# Patient Record
Sex: FEMALE | Race: OTHER | HISPANIC OR LATINO | URBAN - METROPOLITAN AREA
[De-identification: names, ages, dates, MRNs, and addresses within clinical notes are randomized per-mention and may not be internally consistent; named-entity substitution may affect disease eponyms.]

---

## 2017-06-17 ENCOUNTER — OUTPATIENT (OUTPATIENT)
Dept: OUTPATIENT SERVICES | Facility: HOSPITAL | Age: 22
LOS: 1 days | End: 2017-06-17
Payer: COMMERCIAL

## 2017-06-17 DIAGNOSIS — O47.1 FALSE LABOR AT OR AFTER 37 COMPLETED WEEKS OF GESTATION: ICD-10-CM

## 2017-06-17 PROCEDURE — 59025 FETAL NON-STRESS TEST: CPT

## 2017-06-17 PROCEDURE — G0463: CPT

## 2017-06-17 PROCEDURE — 76815 OB US LIMITED FETUS(S): CPT

## 2017-06-20 ENCOUNTER — INPATIENT (INPATIENT)
Facility: HOSPITAL | Age: 22
LOS: 2 days | Discharge: HOME | End: 2017-06-23
Attending: OBSTETRICS & GYNECOLOGY | Admitting: OBSTETRICS & GYNECOLOGY
Payer: COMMERCIAL

## 2017-06-20 DIAGNOSIS — O35.9XX0 MATERNAL CARE FOR (SUSPECTED) FETAL ABNORMALITY AND DAMAGE, UNSPECIFIED, NOT APPLICABLE OR UNSPECIFIED: ICD-10-CM

## 2017-06-20 LAB
ALBUMIN SERPL ELPH-MCNC: 3.1 G/DL — LOW (ref 3.3–5.2)
ALP SERPL-CCNC: 280 U/L — HIGH (ref 40–120)
ALT FLD-CCNC: 13 U/L — SIGNIFICANT CHANGE UP
ANION GAP SERPL CALC-SCNC: 14 MMOL/L — SIGNIFICANT CHANGE UP (ref 5–17)
AST SERPL-CCNC: 21 U/L — SIGNIFICANT CHANGE UP
BASOPHILS # BLD AUTO: 0 K/UL — SIGNIFICANT CHANGE UP (ref 0–0.2)
BASOPHILS NFR BLD AUTO: 0.4 % — SIGNIFICANT CHANGE UP (ref 0–2)
BILIRUB SERPL-MCNC: 0.5 MG/DL — SIGNIFICANT CHANGE UP (ref 0.4–2)
BLD GP AB SCN SERPL QL: SIGNIFICANT CHANGE UP
BUN SERPL-MCNC: 9 MG/DL — SIGNIFICANT CHANGE UP (ref 8–20)
CALCIUM SERPL-MCNC: 8.7 MG/DL — SIGNIFICANT CHANGE UP (ref 8.6–10.2)
CHLORIDE SERPL-SCNC: 104 MMOL/L — SIGNIFICANT CHANGE UP (ref 98–107)
CO2 SERPL-SCNC: 22 MMOL/L — SIGNIFICANT CHANGE UP (ref 22–29)
CREAT SERPL-MCNC: 0.51 MG/DL — SIGNIFICANT CHANGE UP (ref 0.5–1.3)
EOSINOPHIL # BLD AUTO: 0.7 K/UL — HIGH (ref 0–0.5)
EOSINOPHIL NFR BLD AUTO: 7.3 % — HIGH (ref 0–6)
GLUCOSE SERPL-MCNC: 89 MG/DL — SIGNIFICANT CHANGE UP (ref 70–115)
HCT VFR BLD CALC: 32 % — LOW (ref 37–47)
HGB BLD-MCNC: 10.7 G/DL — LOW (ref 12–16)
LYMPHOCYTES # BLD AUTO: 1.8 K/UL — SIGNIFICANT CHANGE UP (ref 1–4.8)
LYMPHOCYTES # BLD AUTO: 19.9 % — LOW (ref 20–55)
MCHC RBC-ENTMCNC: 25.8 PG — LOW (ref 27–31)
MCHC RBC-ENTMCNC: 33.4 G/DL — SIGNIFICANT CHANGE UP (ref 32–36)
MCV RBC AUTO: 77.3 FL — LOW (ref 81–99)
MONOCYTES # BLD AUTO: 0.7 K/UL — SIGNIFICANT CHANGE UP (ref 0–0.8)
MONOCYTES NFR BLD AUTO: 7.8 % — SIGNIFICANT CHANGE UP (ref 3–10)
NEUTROPHILS # BLD AUTO: 5.9 K/UL — SIGNIFICANT CHANGE UP (ref 1.8–8)
NEUTROPHILS NFR BLD AUTO: 64.4 % — SIGNIFICANT CHANGE UP (ref 37–73)
PLATELET # BLD AUTO: 186 K/UL — SIGNIFICANT CHANGE UP (ref 150–400)
POTASSIUM SERPL-MCNC: 4.3 MMOL/L — SIGNIFICANT CHANGE UP (ref 3.5–5.3)
POTASSIUM SERPL-SCNC: 4.3 MMOL/L — SIGNIFICANT CHANGE UP (ref 3.5–5.3)
PROT SERPL-MCNC: 6.8 G/DL — SIGNIFICANT CHANGE UP (ref 6.6–8.7)
RBC # BLD: 4.14 M/UL — LOW (ref 4.4–5.2)
RBC # FLD: 17.2 % — HIGH (ref 11–15.6)
SODIUM SERPL-SCNC: 140 MMOL/L — SIGNIFICANT CHANGE UP (ref 135–145)
TYPE + AB SCN PNL BLD: SIGNIFICANT CHANGE UP
WBC # BLD: 9.1 K/UL — SIGNIFICANT CHANGE UP (ref 4.8–10.8)
WBC # FLD AUTO: 9.1 K/UL — SIGNIFICANT CHANGE UP (ref 4.8–10.8)

## 2017-06-20 RX ORDER — SODIUM CHLORIDE 9 MG/ML
1000 INJECTION, SOLUTION INTRAVENOUS ONCE
Qty: 0 | Refills: 0 | Status: DISCONTINUED | OUTPATIENT
Start: 2017-06-20 | End: 2017-06-21

## 2017-06-20 RX ORDER — CITRIC ACID/SODIUM CITRATE 300-500 MG
30 SOLUTION, ORAL ORAL ONCE
Qty: 0 | Refills: 0 | Status: DISCONTINUED | OUTPATIENT
Start: 2017-06-20 | End: 2017-06-21

## 2017-06-20 RX ORDER — OXYTOCIN 10 UNIT/ML
333.33 VIAL (ML) INJECTION
Qty: 20 | Refills: 0 | Status: COMPLETED | OUTPATIENT
Start: 2017-06-20

## 2017-06-20 RX ORDER — SODIUM CHLORIDE 9 MG/ML
1000 INJECTION, SOLUTION INTRAVENOUS
Qty: 0 | Refills: 0 | Status: DISCONTINUED | OUTPATIENT
Start: 2017-06-20 | End: 2017-06-21

## 2017-06-21 VITALS
HEART RATE: 102 BPM | DIASTOLIC BLOOD PRESSURE: 70 MMHG | TEMPERATURE: 99 F | SYSTOLIC BLOOD PRESSURE: 133 MMHG | RESPIRATION RATE: 18 BRPM

## 2017-06-21 LAB
BASE EXCESS BLDCOA CALC-SCNC: -9.3 MMOL/L — SIGNIFICANT CHANGE UP (ref -11.6–0.4)
BASE EXCESS BLDCOV CALC-SCNC: -9.7 MMOL/L — LOW (ref -9.3–0.3)
GAS PNL BLDCOV: 7.09 — LOW (ref 7.11–7.36)
HCO3 BLDCOA-SCNC: 20 MMOL/L — SIGNIFICANT CHANGE UP (ref 15–27)
HCO3 BLDCOV-SCNC: 21 MMOL/L — SIGNIFICANT CHANGE UP (ref 17–25)
PCO2 BLDCOA: 62.1 MMHG — SIGNIFICANT CHANGE UP (ref 32.2–65.8)
PCO2 BLDCOV: 73.3 MMHG — CRITICAL HIGH (ref 27–49.4)
PH BLDCOA: 7.15 — SIGNIFICANT CHANGE UP (ref 7.11–7.36)
PO2 BLDCOA: 10 MMHG — LOW (ref 17.4–41)
PO2 BLDCOA: 18 MMHG — SIGNIFICANT CHANGE UP (ref 6–30)
RPR SERPL-ACNC: SIGNIFICANT CHANGE UP
SAO2 % BLDCOA: SIGNIFICANT CHANGE UP
SAO2 % BLDCOV: SIGNIFICANT CHANGE UP

## 2017-06-21 RX ORDER — ACETAMINOPHEN 500 MG
650 TABLET ORAL EVERY 6 HOURS
Qty: 0 | Refills: 0 | Status: DISCONTINUED | OUTPATIENT
Start: 2017-06-21 | End: 2017-06-23

## 2017-06-21 RX ORDER — HYDROCORTISONE 1 %
1 OINTMENT (GRAM) TOPICAL EVERY 4 HOURS
Qty: 0 | Refills: 0 | Status: DISCONTINUED | OUTPATIENT
Start: 2017-06-21 | End: 2017-06-23

## 2017-06-21 RX ORDER — DOCUSATE SODIUM 100 MG
100 CAPSULE ORAL
Qty: 0 | Refills: 0 | Status: DISCONTINUED | OUTPATIENT
Start: 2017-06-21 | End: 2017-06-23

## 2017-06-21 RX ORDER — TETANUS TOXOID, REDUCED DIPHTHERIA TOXOID AND ACELLULAR PERTUSSIS VACCINE, ADSORBED 5; 2.5; 8; 8; 2.5 [IU]/.5ML; [IU]/.5ML; UG/.5ML; UG/.5ML; UG/.5ML
0.5 SUSPENSION INTRAMUSCULAR ONCE
Qty: 0 | Refills: 0 | Status: DISCONTINUED | OUTPATIENT
Start: 2017-06-21 | End: 2017-06-23

## 2017-06-21 RX ORDER — OXYTOCIN 10 UNIT/ML
41.67 VIAL (ML) INJECTION
Qty: 20 | Refills: 0 | Status: DISCONTINUED | OUTPATIENT
Start: 2017-06-21 | End: 2017-06-23

## 2017-06-21 RX ORDER — OXYTOCIN 10 UNIT/ML
2 VIAL (ML) INJECTION
Qty: 30 | Refills: 0 | Status: DISCONTINUED | OUTPATIENT
Start: 2017-06-21 | End: 2017-06-23

## 2017-06-21 RX ORDER — AER TRAVELER 0.5 G/1
1 SOLUTION RECTAL; TOPICAL EVERY 4 HOURS
Qty: 0 | Refills: 0 | Status: DISCONTINUED | OUTPATIENT
Start: 2017-06-21 | End: 2017-06-23

## 2017-06-21 RX ORDER — LANOLIN
1 OINTMENT (GRAM) TOPICAL EVERY 6 HOURS
Qty: 0 | Refills: 0 | Status: DISCONTINUED | OUTPATIENT
Start: 2017-06-21 | End: 2017-06-23

## 2017-06-21 RX ORDER — DIBUCAINE 1 %
1 OINTMENT (GRAM) RECTAL EVERY 4 HOURS
Qty: 0 | Refills: 0 | Status: DISCONTINUED | OUTPATIENT
Start: 2017-06-21 | End: 2017-06-23

## 2017-06-21 RX ORDER — GLYCERIN ADULT
1 SUPPOSITORY, RECTAL RECTAL AT BEDTIME
Qty: 0 | Refills: 0 | Status: DISCONTINUED | OUTPATIENT
Start: 2017-06-21 | End: 2017-06-23

## 2017-06-21 RX ORDER — PRAMOXINE HYDROCHLORIDE 150 MG/15G
1 AEROSOL, FOAM RECTAL EVERY 4 HOURS
Qty: 0 | Refills: 0 | Status: DISCONTINUED | OUTPATIENT
Start: 2017-06-21 | End: 2017-06-23

## 2017-06-21 RX ORDER — SIMETHICONE 80 MG/1
80 TABLET, CHEWABLE ORAL EVERY 6 HOURS
Qty: 0 | Refills: 0 | Status: DISCONTINUED | OUTPATIENT
Start: 2017-06-21 | End: 2017-06-23

## 2017-06-21 RX ORDER — CARBOPROST TROMETHAMINE 250 UG/ML
250 INJECTION, SOLUTION INTRAMUSCULAR ONCE
Qty: 0 | Refills: 0 | Status: COMPLETED | OUTPATIENT
Start: 2017-06-21 | End: 2017-06-21

## 2017-06-21 RX ORDER — DIPHENHYDRAMINE HCL 50 MG
25 CAPSULE ORAL ONCE
Qty: 0 | Refills: 0 | Status: COMPLETED | OUTPATIENT
Start: 2017-06-21 | End: 2017-06-21

## 2017-06-21 RX ORDER — SODIUM CHLORIDE 9 MG/ML
3 INJECTION INTRAMUSCULAR; INTRAVENOUS; SUBCUTANEOUS EVERY 8 HOURS
Qty: 0 | Refills: 0 | Status: DISCONTINUED | OUTPATIENT
Start: 2017-06-21 | End: 2017-06-23

## 2017-06-21 RX ORDER — OXYTOCIN 10 UNIT/ML
333.33 VIAL (ML) INJECTION
Qty: 20 | Refills: 0 | Status: COMPLETED | OUTPATIENT
Start: 2017-06-21 | End: 2017-06-21

## 2017-06-21 RX ORDER — MAGNESIUM HYDROXIDE 400 MG/1
30 TABLET, CHEWABLE ORAL
Qty: 0 | Refills: 0 | Status: DISCONTINUED | OUTPATIENT
Start: 2017-06-21 | End: 2017-06-23

## 2017-06-21 RX ORDER — IBUPROFEN 200 MG
600 TABLET ORAL EVERY 6 HOURS
Qty: 0 | Refills: 0 | Status: DISCONTINUED | OUTPATIENT
Start: 2017-06-21 | End: 2017-06-23

## 2017-06-21 RX ORDER — DIPHENHYDRAMINE HCL 50 MG
25 CAPSULE ORAL EVERY 6 HOURS
Qty: 0 | Refills: 0 | Status: DISCONTINUED | OUTPATIENT
Start: 2017-06-21 | End: 2017-06-23

## 2017-06-21 RX ADMIN — SODIUM CHLORIDE 125 MILLILITER(S): 9 INJECTION, SOLUTION INTRAVENOUS at 00:42

## 2017-06-21 RX ADMIN — Medication 25 MILLIGRAM(S): at 01:58

## 2017-06-21 RX ADMIN — Medication 2 MILLIUNIT(S)/MIN: at 10:23

## 2017-06-21 RX ADMIN — CARBOPROST TROMETHAMINE 250 MICROGRAM(S): 250 INJECTION, SOLUTION INTRAMUSCULAR at 18:35

## 2017-06-21 RX ADMIN — Medication 1000 MILLIUNIT(S)/MIN: at 19:38

## 2017-06-21 RX ADMIN — Medication 125 MILLIUNIT(S)/MIN: at 19:38

## 2017-06-21 RX ADMIN — SODIUM CHLORIDE 3 MILLILITER(S): 9 INJECTION INTRAMUSCULAR; INTRAVENOUS; SUBCUTANEOUS at 22:30

## 2017-06-22 LAB
ANISOCYTOSIS BLD QL: SLIGHT — SIGNIFICANT CHANGE UP
EOSINOPHIL # BLD AUTO: 0.3 K/UL — SIGNIFICANT CHANGE UP (ref 0–0.5)
EOSINOPHIL NFR BLD AUTO: 2 % — SIGNIFICANT CHANGE UP (ref 0–5)
HCT VFR BLD CALC: 25.2 % — LOW (ref 37–47)
HGB BLD-MCNC: 8.2 G/DL — LOW (ref 12–16)
HYPOCHROMIA BLD QL: SLIGHT — SIGNIFICANT CHANGE UP
LYMPHOCYTES # BLD AUTO: 17 % — LOW (ref 20–55)
LYMPHOCYTES # BLD AUTO: 2.7 K/UL — SIGNIFICANT CHANGE UP (ref 1–4.8)
MCHC RBC-ENTMCNC: 25.1 PG — LOW (ref 27–31)
MCHC RBC-ENTMCNC: 32.5 G/DL — SIGNIFICANT CHANGE UP (ref 32–36)
MCV RBC AUTO: 77.1 FL — LOW (ref 81–99)
MONOCYTES # BLD AUTO: 1.2 K/UL — HIGH (ref 0–0.8)
MONOCYTES NFR BLD AUTO: 8 % — SIGNIFICANT CHANGE UP (ref 3–10)
NEUTROPHILS # BLD AUTO: 10.9 K/UL — HIGH (ref 1.8–8)
NEUTROPHILS NFR BLD AUTO: 70 % — SIGNIFICANT CHANGE UP (ref 37–73)
NEUTS BAND # BLD: 3 % — SIGNIFICANT CHANGE UP (ref 0–8)
OVALOCYTES BLD QL SMEAR: SLIGHT — SIGNIFICANT CHANGE UP
PLAT MORPH BLD: NORMAL — SIGNIFICANT CHANGE UP
PLATELET # BLD AUTO: 174 K/UL — SIGNIFICANT CHANGE UP (ref 150–400)
POIKILOCYTOSIS BLD QL AUTO: SLIGHT — SIGNIFICANT CHANGE UP
RBC # BLD: 3.27 M/UL — LOW (ref 4.4–5.2)
RBC # FLD: 17 % — HIGH (ref 11–15.6)
RBC BLD AUTO: ABNORMAL
WBC # BLD: 15.2 K/UL — HIGH (ref 4.8–10.8)
WBC # FLD AUTO: 15.2 K/UL — HIGH (ref 4.8–10.8)

## 2017-06-22 RX ORDER — IBUPROFEN 200 MG
1 TABLET ORAL
Qty: 20 | Refills: 0
Start: 2017-06-22

## 2017-06-22 RX ADMIN — Medication 600 MILLIGRAM(S): at 03:39

## 2017-06-22 RX ADMIN — Medication 600 MILLIGRAM(S): at 22:48

## 2017-06-22 RX ADMIN — Medication 600 MILLIGRAM(S): at 12:03

## 2017-06-22 RX ADMIN — Medication 25 MILLIGRAM(S): at 18:54

## 2017-06-22 RX ADMIN — Medication 1 TABLET(S): at 12:03

## 2017-06-22 RX ADMIN — Medication 600 MILLIGRAM(S): at 23:45

## 2017-06-22 NOTE — DISCHARGE NOTE OB - CARE PROVIDER_API CALL
[facility],   [address] [facility] [address] [facility], [place] [address]  Ph: [phone]  Phone: (   )    -  Fax: (   )    -

## 2017-06-22 NOTE — DISCHARGE NOTE OB - PLAN OF CARE
delivered To follow up at [facility] in 6 weeks for post partum check up. Diet and activity as tolerated.  Take medication as indicated

## 2017-06-22 NOTE — DISCHARGE NOTE OB - PATIENT PORTAL LINK FT
“You can access the [facility] Patient Portal, offered by [facility], by registering with the following website: [email or web address]”

## 2017-06-22 NOTE — DISCHARGE NOTE OB - CARE PLAN
Principal Discharge DX:	 (normal spontaneous vaginal delivery)  Goal:	delivered  Instructions for follow-up, activity and diet:	To follow up at [facility] in 6 weeks for post partum check up. Diet and activity as tolerated.  Take medication as indicated Principal Discharge DX:	 (normal spontaneous vaginal delivery)  Goal:	delivered  Instructions for follow-up, activity and diet:	To follow up at [facility] in 6 weeks for post partum check up. Diet and activity as tolerated.  Take medication as indicated Principal Discharge DX:	 (normal spontaneous vaginal delivery)  Goal:	delivered  Instructions for follow-up, activity and diet:	To follow up at [facility] in 6 weeks for post partum check up. Diet and activity as tolerated.  Take medication as indicated

## 2017-06-22 NOTE — DISCHARGE NOTE OB - PROVIDER TOKENS
FREE:[LAST:[HR],PHONE:[(   )    -],FAX:[(   )    -],ADDRESS:[[facility], [place] [address]  Ph: [phone]]]

## 2017-06-23 VITALS
SYSTOLIC BLOOD PRESSURE: 107 MMHG | DIASTOLIC BLOOD PRESSURE: 74 MMHG | TEMPERATURE: 99 F | RESPIRATION RATE: 18 BRPM | HEART RATE: 79 BPM

## 2017-06-23 RX ADMIN — Medication 1 TABLET(S): at 11:51

## 2017-06-23 RX ADMIN — Medication 25 MILLIGRAM(S): at 11:51

## 2017-06-23 NOTE — PROGRESS NOTE ADULT - PROBLEM SELECTOR PLAN 1
Encourage ambulation & hydration  Encourage mother/baby interaction  Plan for discharge today pending attending eval
Encourage ambulation & hydration  Encourage mother/baby interaction  Plan for discharge PPD #2

## 2017-06-23 NOTE — PROGRESS NOTE ADULT - SUBJECTIVE AND OBJECTIVE BOX
21y year old  s/p  with vacuum assist at 39 wks gestation PPD#2   Patient seen and examined at bedside, no acute overnight events.   Patient is ambulating, +eating, +PO hydration, +voiding, +Flatus, +BM, +Formula feeding, +Breast feeding and pain is well controlled.  Denies headache, SOB, fever, chills and calf pain. +pruritic rash improved with benadryl.       Vital Signs Last 24 Hrs  T(C): 36.7, Max: 36.7 (- @ 20:54)  T(F): 98.1, Max: 98.1 (- @ 20:54)  HR: 87 (72 - 87)  BP: 125/80 (122/77 - 125/80)  BP(mean): --  RR: 19 (18 - 19)  SpO2: --    Physical Exam:  General: NAD  CVS: RRR, +S1/S2  Lungs: CTAB, no wheeze, ronchi or rales.   Abdomen: +BS, soft, ND, minimally tender, Fundus firm 1 cm below umbilicus  Ext: No cyanosis, edema or calf tenderness.     Labs:                        10.7   9.1   )-----------( 186      ( 2017 22:16 )             32.0       MEDICATIONS  (STANDING):  oxytocin Infusion 2milliUNIT(s)/Min IV Continuous <Continuous>  sodium chloride 0.9% lock flush 3milliLiter(s) IV Push every 8 hours  diphtheria/tetanus/pertussis (acellular) Vaccine (ADAcel) 0.5milliLiter(s) IntraMuscular once  oxytocin Infusion 41.667milliUNIT(s)/Min IV Continuous <Continuous>  prenatal multivitamin 1Tablet(s) Oral daily    MEDICATIONS  (PRN):  acetaminophen   Tablet. 650milliGRAM(s) Oral every 6 hours PRN Mild Pain  acetaminophen   Tablet 650milliGRAM(s) Oral every 6 hours PRN For Temp greater than 38.5 C (101.3 F)  ibuprofen  Tablet 600milliGRAM(s) Oral every 6 hours PRN Moderate Pain  oxyCODONE  5 mG/acetaminophen 325 mG 2Tablet(s) Oral every 6 hours PRN Severe Pain  hydrocortisone 1% Cream 1Application(s) Topical every 4 hours PRN Moderate to Severe Perineal Pain  pramoxine 1%/zinc 5% Cream 1Application(s) Topical every 4 hours PRN Moderate to Severe Perineal Pain  dibucaine 1% Ointment 1Application(s) Topical every 4 hours PRN Perineal Discomfort  lanolin Ointment 1Application(s) Topical every 6 hours PRN Sore Nipples  witch hazel Pads 1Application(s) Topical every 4 hours PRN Perineal Discomfort  simethicone 80milliGRAM(s) Chew every 6 hours PRN Gas  diphenhydrAMINE   Capsule 25milliGRAM(s) Oral every 6 hours PRN Itching  glycerin Suppository - Adult 1Suppository(s) Rectal at bedtime PRN Constipation  magnesium hydroxide Suspension 30milliLiter(s) Oral two times a day PRN Constipation  docusate sodium 100milliGRAM(s) Oral two times a day PRN Stool Softening
21y year old  s/p  with vacuum assist at 39 wks gestation PPD#1   Patient seen and examined at bedside, no acute overnight events.   Patient is ambulating, +eating, +PO hydration, +voiding, +Flatus, -BM, +Formula feeding, +Breast feeding and pain is well controlled.  Denies headache, SOB, fever, chills and calf pain.      VS:   Vital Signs Last 24 Hrs  T(C): 36.6, Max: 37 (06-21 @ 18:53)  T(F): 97.8, Max: 98.6 (06-21 @ 18:53)  HR: 72 (71 - 102)  BP: 130/81 (122/85 - 141/93)  RR: 18 (16 - 18)    Physical Exam:  General: NAD  CVS: RRR, +S1/S2  Lungs: CTAB, no wheeze, ronchi or rales.   Abdomen: +BS, soft, ND, minimally tender, Fundus firm at level of umbilicus.   Ext: No cyanosis, edema or calf tenderness.     Labs:                        10.7   9.1   )-----------( 186      ( 2017 22:16 )             32.0         Medication:  MEDICATIONS  (STANDING):  oxytocin Infusion 2milliUNIT(s)/Min IV Continuous <Continuous>  sodium chloride 0.9% lock flush 3milliLiter(s) IV Push every 8 hours  diphtheria/tetanus/pertussis (acellular) Vaccine (ADAcel) 0.5milliLiter(s) IntraMuscular once  oxytocin Infusion 41.667milliUNIT(s)/Min IV Continuous <Continuous>  prenatal multivitamin 1Tablet(s) Oral daily    MEDICATIONS  (PRN):  acetaminophen   Tablet. 650milliGRAM(s) Oral every 6 hours PRN Mild Pain  acetaminophen   Tablet 650milliGRAM(s) Oral every 6 hours PRN For Temp greater than 38.5 C (101.3 F)  ibuprofen  Tablet 600milliGRAM(s) Oral every 6 hours PRN Moderate Pain  oxyCODONE  5 mG/acetaminophen 325 mG 2Tablet(s) Oral every 6 hours PRN Severe Pain  hydrocortisone 1% Cream 1Application(s) Topical every 4 hours PRN Moderate to Severe Perineal Pain  pramoxine 1%/zinc 5% Cream 1Application(s) Topical every 4 hours PRN Moderate to Severe Perineal Pain  dibucaine 1% Ointment 1Application(s) Topical every 4 hours PRN Perineal Discomfort  lanolin Ointment 1Application(s) Topical every 6 hours PRN Sore Nipples  witch hazel Pads 1Application(s) Topical every 4 hours PRN Perineal Discomfort  simethicone 80milliGRAM(s) Chew every 6 hours PRN Gas  diphenhydrAMINE   Capsule 25milliGRAM(s) Oral every 6 hours PRN Itching  glycerin Suppository - Adult 1Suppository(s) Rectal at bedtime PRN Constipation  magnesium hydroxide Suspension 30milliLiter(s) Oral two times a day PRN Constipation  docusate sodium 100milliGRAM(s) Oral two times a day PRN Stool Softening
A/P 21y on PPD#2 s/p , stable  Ambulating, tolerating PO, lochia decreased, breast and bottle feeding, rash improved with benadryl. She denies SOB and lightheadedness. PP precautions reviewed  Vital Signs Last 24 Hrs  T(C): 36.7, Max: 36.7 (- @ 20:54)  T(F): 98.1, Max: 98.1 ( @ 20:54)  HR: 87 (72 - 87)  BP: 125/80 (122/77 - 125/80)  BP(mean): --  RR: 19 (18 - 19)  SpO2: --    PHYSICAL EXAM:    CHEST/LUNG: Clear to percussion bilaterally; No rales, rhonchi, wheezing, or rubs  HEART: Regular rate and rhythm; No murmurs, rubs, or gallops  BREASTS: deferred  ABDOMEN: Soft, Nontender, Nondistended; fundus is firm and Bowel sounds present  PERINEUM: Intact  and lochia minimal  EXTREMITIES:  2+ Peripheral Pulses, No clubbing, cyanosis, or edema, no VT    MEDICATIONS  (STANDING):  oxytocin Infusion 2milliUNIT(s)/Min IV Continuous <Continuous>  sodium chloride 0.9% lock flush 3milliLiter(s) IV Push every 8 hours  diphtheria/tetanus/pertussis (acellular) Vaccine (ADAcel) 0.5milliLiter(s) IntraMuscular once  oxytocin Infusion 41.667milliUNIT(s)/Min IV Continuous <Continuous>  prenatal multivitamin 1Tablet(s) Oral daily    MEDICATIONS  (PRN):  acetaminophen   Tablet. 650milliGRAM(s) Oral every 6 hours PRN Mild Pain  acetaminophen   Tablet 650milliGRAM(s) Oral every 6 hours PRN For Temp greater than 38.5 C (101.3 F)  ibuprofen  Tablet 600milliGRAM(s) Oral every 6 hours PRN Moderate Pain  oxyCODONE  5 mG/acetaminophen 325 mG 2Tablet(s) Oral every 6 hours PRN Severe Pain  hydrocortisone 1% Cream 1Application(s) Topical every 4 hours PRN Moderate to Severe Perineal Pain  pramoxine 1%/zinc 5% Cream 1Application(s) Topical every 4 hours PRN Moderate to Severe Perineal Pain  dibucaine 1% Ointment 1Application(s) Topical every 4 hours PRN Perineal Discomfort  lanolin Ointment 1Application(s) Topical every 6 hours PRN Sore Nipples  witch hazel Pads 1Application(s) Topical every 4 hours PRN Perineal Discomfort  simethicone 80milliGRAM(s) Chew every 6 hours PRN Gas  diphenhydrAMINE   Capsule 25milliGRAM(s) Oral every 6 hours PRN Itching  glycerin Suppository - Adult 1Suppository(s) Rectal at bedtime PRN Constipation  magnesium hydroxide Suspension 30milliLiter(s) Oral two times a day PRN Constipation  docusate sodium 100milliGRAM(s) Oral two times a day PRN Stool Softening        LABS:                        8.2    15.2  )-----------( 174      ( 2017 08:00 )             25.2       1. Pain: well controlled on OPM  2. GI: Regular diet  3. : voiding  4. DVT prophylaxis: ambulate  5. Dispo: D/C home and follow up at [facility]
INTERVAL HPI/OVERNIGHT EVENTS:  21y Female s/p labor epidural on 6/21/17    Vital Signs Last 24 Hrs  T(C): 36.6, Max: 37 (06-21 @ 18:53)  T(F): 97.9, Max: 98.6 (06-21 @ 18:53)  HR: 72 (71 - 102)  BP: 122/77 (122/77 - 141/93)  BP(mean): --  RR: 18 (16 - 18)  SpO2: --    Patient seen, doing well, no headache, no residual numbness or weakness, no anesthetic complications or complaints noted or reported.

## 2017-06-23 NOTE — PROGRESS NOTE ADULT - ASSESSMENT
21y year old  s/p  with vacuum assist at 39 wks gestation PPD#2
21y year old  s/p  with vacuum assist at 39 wks gestation PPD#1.

## 2017-07-23 PROCEDURE — 86900 BLOOD TYPING SEROLOGIC ABO: CPT

## 2017-07-23 PROCEDURE — 36415 COLL VENOUS BLD VENIPUNCTURE: CPT

## 2017-07-23 PROCEDURE — 85027 COMPLETE CBC AUTOMATED: CPT

## 2017-07-23 PROCEDURE — T1013: CPT

## 2017-07-23 PROCEDURE — 86901 BLOOD TYPING SEROLOGIC RH(D): CPT

## 2017-07-23 PROCEDURE — 82803 BLOOD GASES ANY COMBINATION: CPT

## 2017-07-23 PROCEDURE — 86850 RBC ANTIBODY SCREEN: CPT

## 2017-07-23 PROCEDURE — 86592 SYPHILIS TEST NON-TREP QUAL: CPT

## 2017-07-23 PROCEDURE — 80053 COMPREHEN METABOLIC PANEL: CPT

## 2018-06-07 PROBLEM — Z00.00 ENCOUNTER FOR PREVENTIVE HEALTH EXAMINATION: Status: ACTIVE | Noted: 2018-06-07

## 2018-12-31 ENCOUNTER — APPOINTMENT (OUTPATIENT)
Dept: ANTEPARTUM | Facility: CLINIC | Age: 23
End: 2018-12-31
Payer: MEDICAID

## 2018-12-31 ENCOUNTER — ASOB RESULT (OUTPATIENT)
Age: 23
End: 2018-12-31

## 2018-12-31 PROCEDURE — 76817 TRANSVAGINAL US OBSTETRIC: CPT

## 2019-02-07 ENCOUNTER — APPOINTMENT (OUTPATIENT)
Age: 24
End: 2019-02-07
Payer: MEDICAID

## 2019-02-07 ENCOUNTER — ASOB RESULT (OUTPATIENT)
Age: 24
End: 2019-02-07

## 2019-02-07 PROCEDURE — 76805 OB US >/= 14 WKS SNGL FETUS: CPT

## 2019-04-03 ENCOUNTER — ASOB RESULT (OUTPATIENT)
Age: 24
End: 2019-04-03

## 2019-04-03 ENCOUNTER — APPOINTMENT (OUTPATIENT)
Age: 24
End: 2019-04-03
Payer: MEDICAID

## 2019-04-03 PROCEDURE — 76816 OB US FOLLOW-UP PER FETUS: CPT

## 2019-05-31 ENCOUNTER — APPOINTMENT (OUTPATIENT)
Dept: ANTEPARTUM | Facility: CLINIC | Age: 24
End: 2019-05-31

## 2019-06-03 ENCOUNTER — ASOB RESULT (OUTPATIENT)
Age: 24
End: 2019-06-03

## 2019-06-03 ENCOUNTER — APPOINTMENT (OUTPATIENT)
Dept: ANTEPARTUM | Facility: CLINIC | Age: 24
End: 2019-06-03
Payer: MEDICAID

## 2019-06-03 PROCEDURE — 76816 OB US FOLLOW-UP PER FETUS: CPT

## 2019-06-16 ENCOUNTER — INPATIENT (INPATIENT)
Facility: HOSPITAL | Age: 24
LOS: 1 days | Discharge: HOME | End: 2019-06-18
Attending: OBSTETRICS & GYNECOLOGY | Admitting: OBSTETRICS & GYNECOLOGY
Payer: COMMERCIAL

## 2019-06-16 VITALS
TEMPERATURE: 98 F | DIASTOLIC BLOOD PRESSURE: 77 MMHG | SYSTOLIC BLOOD PRESSURE: 119 MMHG | RESPIRATION RATE: 16 BRPM | HEART RATE: 79 BPM

## 2019-06-16 DIAGNOSIS — O47.1 FALSE LABOR AT OR AFTER 37 COMPLETED WEEKS OF GESTATION: ICD-10-CM

## 2019-06-16 DIAGNOSIS — O26.893 OTHER SPECIFIED PREGNANCY RELATED CONDITIONS, THIRD TRIMESTER: ICD-10-CM

## 2019-06-16 LAB
APPEARANCE UR: CLEAR — SIGNIFICANT CHANGE UP
BASOPHILS # BLD AUTO: 0 K/UL — SIGNIFICANT CHANGE UP (ref 0–0.2)
BASOPHILS NFR BLD AUTO: 0.3 % — SIGNIFICANT CHANGE UP (ref 0–2)
BILIRUB UR-MCNC: NEGATIVE — SIGNIFICANT CHANGE UP
BLD GP AB SCN SERPL QL: SIGNIFICANT CHANGE UP
COLOR SPEC: YELLOW — SIGNIFICANT CHANGE UP
DIFF PNL FLD: NEGATIVE — SIGNIFICANT CHANGE UP
EOSINOPHIL # BLD AUTO: 0.1 K/UL — SIGNIFICANT CHANGE UP (ref 0–0.5)
EOSINOPHIL NFR BLD AUTO: 0.8 % — SIGNIFICANT CHANGE UP (ref 0–6)
EPI CELLS # UR: SIGNIFICANT CHANGE UP
GLUCOSE UR QL: NEGATIVE MG/DL — SIGNIFICANT CHANGE UP
HCT VFR BLD CALC: 32.7 % — LOW (ref 37–47)
HGB BLD-MCNC: 10 G/DL — LOW (ref 12–16)
KETONES UR-MCNC: NEGATIVE — SIGNIFICANT CHANGE UP
LEUKOCYTE ESTERASE UR-ACNC: ABNORMAL
LYMPHOCYTES # BLD AUTO: 1.9 K/UL — SIGNIFICANT CHANGE UP (ref 1–4.8)
LYMPHOCYTES # BLD AUTO: 25.4 % — SIGNIFICANT CHANGE UP (ref 20–55)
MCHC RBC-ENTMCNC: 22 PG — LOW (ref 27–31)
MCHC RBC-ENTMCNC: 30.6 G/DL — LOW (ref 32–36)
MCV RBC AUTO: 72 FL — LOW (ref 81–99)
MONOCYTES # BLD AUTO: 0.5 K/UL — SIGNIFICANT CHANGE UP (ref 0–0.8)
MONOCYTES NFR BLD AUTO: 6.6 % — SIGNIFICANT CHANGE UP (ref 3–10)
NEUTROPHILS # BLD AUTO: 5 K/UL — SIGNIFICANT CHANGE UP (ref 1.8–8)
NEUTROPHILS NFR BLD AUTO: 66.6 % — SIGNIFICANT CHANGE UP (ref 37–73)
NITRITE UR-MCNC: NEGATIVE — SIGNIFICANT CHANGE UP
PH UR: 8 — SIGNIFICANT CHANGE UP (ref 5–8)
PLAT MORPH BLD: NORMAL — SIGNIFICANT CHANGE UP
PLATELET # BLD AUTO: 234 K/UL — SIGNIFICANT CHANGE UP (ref 150–400)
PROT UR-MCNC: 15 MG/DL
RBC # BLD: 4.54 M/UL — SIGNIFICANT CHANGE UP (ref 4.4–5.2)
RBC # FLD: 15.4 % — SIGNIFICANT CHANGE UP (ref 11–15.6)
RBC BLD AUTO: NORMAL — SIGNIFICANT CHANGE UP
RBC CASTS # UR COMP ASSIST: NEGATIVE /HPF — SIGNIFICANT CHANGE UP (ref 0–4)
SP GR SPEC: 1.01 — SIGNIFICANT CHANGE UP (ref 1.01–1.02)
TYPE + AB SCN PNL BLD: SIGNIFICANT CHANGE UP
UROBILINOGEN FLD QL: NEGATIVE MG/DL — SIGNIFICANT CHANGE UP
WBC # BLD: 7.5 K/UL — SIGNIFICANT CHANGE UP (ref 4.8–10.8)
WBC # FLD AUTO: 7.5 K/UL — SIGNIFICANT CHANGE UP (ref 4.8–10.8)
WBC UR QL: SIGNIFICANT CHANGE UP

## 2019-06-16 RX ORDER — ACETAMINOPHEN 500 MG
975 TABLET ORAL EVERY 6 HOURS
Refills: 0 | Status: DISCONTINUED | OUTPATIENT
Start: 2019-06-17 | End: 2019-06-18

## 2019-06-16 RX ORDER — GLYCERIN ADULT
1 SUPPOSITORY, RECTAL RECTAL AT BEDTIME
Refills: 0 | Status: DISCONTINUED | OUTPATIENT
Start: 2019-06-17 | End: 2019-06-18

## 2019-06-16 RX ORDER — TETANUS TOXOID, REDUCED DIPHTHERIA TOXOID AND ACELLULAR PERTUSSIS VACCINE, ADSORBED 5; 2.5; 8; 8; 2.5 [IU]/.5ML; [IU]/.5ML; UG/.5ML; UG/.5ML; UG/.5ML
0.5 SUSPENSION INTRAMUSCULAR ONCE
Refills: 0 | Status: COMPLETED | OUTPATIENT
Start: 2019-06-17

## 2019-06-16 RX ORDER — KETOROLAC TROMETHAMINE 30 MG/ML
30 SYRINGE (ML) INJECTION ONCE
Refills: 0 | Status: DISCONTINUED | OUTPATIENT
Start: 2019-06-16 | End: 2019-06-16

## 2019-06-16 RX ORDER — SODIUM CHLORIDE 9 MG/ML
1000 INJECTION, SOLUTION INTRAVENOUS
Refills: 0 | Status: DISCONTINUED | OUTPATIENT
Start: 2019-06-16 | End: 2019-06-16

## 2019-06-16 RX ORDER — BENZOCAINE 10 %
1 GEL (GRAM) MUCOUS MEMBRANE EVERY 6 HOURS
Refills: 0 | Status: DISCONTINUED | OUTPATIENT
Start: 2019-06-17 | End: 2019-06-18

## 2019-06-16 RX ORDER — DIBUCAINE 1 %
1 OINTMENT (GRAM) RECTAL EVERY 6 HOURS
Refills: 0 | Status: DISCONTINUED | OUTPATIENT
Start: 2019-06-17 | End: 2019-06-18

## 2019-06-16 RX ORDER — MAGNESIUM HYDROXIDE 400 MG/1
30 TABLET, CHEWABLE ORAL
Refills: 0 | Status: DISCONTINUED | OUTPATIENT
Start: 2019-06-17 | End: 2019-06-18

## 2019-06-16 RX ORDER — LANOLIN
1 OINTMENT (GRAM) TOPICAL EVERY 6 HOURS
Refills: 0 | Status: DISCONTINUED | OUTPATIENT
Start: 2019-06-17 | End: 2019-06-18

## 2019-06-16 RX ORDER — OXYCODONE HYDROCHLORIDE 5 MG/1
5 TABLET ORAL
Refills: 0 | Status: DISCONTINUED | OUTPATIENT
Start: 2019-06-17 | End: 2019-06-18

## 2019-06-16 RX ORDER — AER TRAVELER 0.5 G/1
1 SOLUTION RECTAL; TOPICAL EVERY 4 HOURS
Refills: 0 | Status: DISCONTINUED | OUTPATIENT
Start: 2019-06-17 | End: 2019-06-18

## 2019-06-16 RX ORDER — OXYCODONE HYDROCHLORIDE 5 MG/1
5 TABLET ORAL ONCE
Refills: 0 | Status: DISCONTINUED | OUTPATIENT
Start: 2019-06-17 | End: 2019-06-18

## 2019-06-16 RX ORDER — PRAMOXINE HYDROCHLORIDE 150 MG/15G
1 AEROSOL, FOAM RECTAL EVERY 4 HOURS
Refills: 0 | Status: DISCONTINUED | OUTPATIENT
Start: 2019-06-17 | End: 2019-06-18

## 2019-06-16 RX ORDER — DIPHENHYDRAMINE HCL 50 MG
25 CAPSULE ORAL EVERY 6 HOURS
Refills: 0 | Status: DISCONTINUED | OUTPATIENT
Start: 2019-06-17 | End: 2019-06-18

## 2019-06-16 RX ORDER — DOCUSATE SODIUM 100 MG
100 CAPSULE ORAL
Refills: 0 | Status: DISCONTINUED | OUTPATIENT
Start: 2019-06-17 | End: 2019-06-18

## 2019-06-16 RX ORDER — IBUPROFEN 200 MG
600 TABLET ORAL EVERY 6 HOURS
Refills: 0 | Status: COMPLETED | OUTPATIENT
Start: 2019-06-17 | End: 2020-05-15

## 2019-06-16 RX ORDER — OXYTOCIN 10 UNIT/ML
333.33 VIAL (ML) INJECTION
Qty: 20 | Refills: 0 | Status: DISCONTINUED | OUTPATIENT
Start: 2019-06-16 | End: 2019-06-16

## 2019-06-16 RX ORDER — HYDROCORTISONE 1 %
1 OINTMENT (GRAM) TOPICAL EVERY 6 HOURS
Refills: 0 | Status: DISCONTINUED | OUTPATIENT
Start: 2019-06-17 | End: 2019-06-18

## 2019-06-16 RX ORDER — SIMETHICONE 80 MG/1
80 TABLET, CHEWABLE ORAL EVERY 4 HOURS
Refills: 0 | Status: DISCONTINUED | OUTPATIENT
Start: 2019-06-17 | End: 2019-06-18

## 2019-06-16 RX ORDER — SODIUM CHLORIDE 9 MG/ML
3 INJECTION INTRAMUSCULAR; INTRAVENOUS; SUBCUTANEOUS EVERY 8 HOURS
Refills: 0 | Status: DISCONTINUED | OUTPATIENT
Start: 2019-06-17 | End: 2019-06-18

## 2019-06-16 RX ORDER — CITRIC ACID/SODIUM CITRATE 300-500 MG
30 SOLUTION, ORAL ORAL ONCE
Refills: 0 | Status: DISCONTINUED | OUTPATIENT
Start: 2019-06-16 | End: 2019-06-16

## 2019-06-16 RX ORDER — OXYTOCIN 10 UNIT/ML
333.33 VIAL (ML) INJECTION
Qty: 20 | Refills: 0 | Status: DISCONTINUED | OUTPATIENT
Start: 2019-06-17 | End: 2019-06-18

## 2019-06-16 RX ADMIN — SODIUM CHLORIDE 125 MILLILITER(S): 9 INJECTION, SOLUTION INTRAVENOUS at 20:28

## 2019-06-16 RX ADMIN — Medication 1000 MILLIUNIT(S)/MIN: at 20:39

## 2019-06-16 RX ADMIN — Medication 30 MILLIGRAM(S): at 21:47

## 2019-06-16 RX ADMIN — SODIUM CHLORIDE 125 MILLILITER(S): 9 INJECTION, SOLUTION INTRAVENOUS at 20:02

## 2019-06-16 RX ADMIN — Medication 30 MILLIGRAM(S): at 21:32

## 2019-06-16 NOTE — OB PROVIDER H&P - NSHPPHYSICALEXAM_GEN_ALL_CORE
Vital Signs Last 24 Hrs  T(C): 36.9 (16 Jun 2019 19:46), Max: 36.9 (16 Jun 2019 19:33)  T(F): 98.4 (16 Jun 2019 19:46), Max: 98.4 (16 Jun 2019 19:33)  HR: 93 (16 Jun 2019 19:47) (79 - 93)  BP: 133/90 (16 Jun 2019 19:47) (119/77 - 133/90)  BP(mean): --  RR: 17 (16 Jun 2019 19:46) (16 - 17)  SpO2: --    FHT: baseline 140, moderate variability, +accels, no decels   Stryker: abimael q2-4m  SVE: 6/90/-2

## 2019-06-16 NOTE — OB PROVIDER H&P - HISTORY OF PRESENT ILLNESS
24 y/o  at 38w6d presenting with 4hr history of frequent and painful contractions. Denies vaginal bleeding and leakage of fluid. +FM  Prenatal course uncomplicated.     ObGynHx: VD x1 (2017)   PMH/PSH: none  Meds: PNV   Allergies: NKDA

## 2019-06-16 NOTE — OB RN PATIENT PROFILE - PMH
<<----- Click to add NO pertinent Past Medical History No pertinent past medical history  (normal spontaneous vaginal delivery)  2017

## 2019-06-16 NOTE — OB PROVIDER DELIVERY SUMMARY - NSPROVIDERDELIVERYNOTE_OBGYN_ALL_OB_FT
, viable infant over a midline 2nd degree laceration and a laceration to the right of her urethra  pt pushed well, with minimal assistance  atraumatic shoulder delivery after the cord was removed from the neck, it was loose  30 second delayed cord clamp  infant handed off for skin to skin  cord bloods obtained  placenta was spontaneous and intact  excellent hemostasis  mother and infant stable in the recovery room

## 2019-06-16 NOTE — OB PROVIDER H&P - ASSESSMENT
24 y/o  at 38w6d scheduled for elective induction of labor tomorrow admitted today in active labor.   - consent  - admission labs  - GBS neg, no antibiotic prophylaxis indicated  - expectant management     d/w Dr. Montenegro

## 2019-06-16 NOTE — CHART NOTE - NSCHARTNOTEFT_GEN_A_CORE
Pt arrived without notification  I was then notified by staff  pt complete upon my arrival  arom, clear  pt pushed right away  see delivery summary

## 2019-06-17 LAB
HCT VFR BLD CALC: 25.5 % — LOW (ref 37–47)
HGB BLD-MCNC: 7.9 G/DL — LOW (ref 12–16)

## 2019-06-17 RX ORDER — TETANUS TOXOID, REDUCED DIPHTHERIA TOXOID AND ACELLULAR PERTUSSIS VACCINE, ADSORBED 5; 2.5; 8; 8; 2.5 [IU]/.5ML; [IU]/.5ML; UG/.5ML; UG/.5ML; UG/.5ML
0.5 SUSPENSION INTRAMUSCULAR ONCE
Refills: 0 | Status: COMPLETED | OUTPATIENT
Start: 2019-06-17 | End: 2019-06-17

## 2019-06-17 RX ORDER — IBUPROFEN 200 MG
600 TABLET ORAL EVERY 6 HOURS
Refills: 0 | Status: DISCONTINUED | OUTPATIENT
Start: 2019-06-17 | End: 2019-06-18

## 2019-06-17 RX ADMIN — SODIUM CHLORIDE 3 MILLILITER(S): 9 INJECTION INTRAMUSCULAR; INTRAVENOUS; SUBCUTANEOUS at 12:27

## 2019-06-17 RX ADMIN — Medication 975 MILLIGRAM(S): at 21:13

## 2019-06-17 RX ADMIN — Medication 600 MILLIGRAM(S): at 13:30

## 2019-06-17 RX ADMIN — SODIUM CHLORIDE 3 MILLILITER(S): 9 INJECTION INTRAMUSCULAR; INTRAVENOUS; SUBCUTANEOUS at 08:00

## 2019-06-17 RX ADMIN — Medication 975 MILLIGRAM(S): at 21:43

## 2019-06-17 RX ADMIN — TETANUS TOXOID, REDUCED DIPHTHERIA TOXOID AND ACELLULAR PERTUSSIS VACCINE, ADSORBED 0.5 MILLILITER(S): 5; 2.5; 8; 8; 2.5 SUSPENSION INTRAMUSCULAR at 21:11

## 2019-06-17 RX ADMIN — Medication 600 MILLIGRAM(S): at 12:36

## 2019-06-17 RX ADMIN — Medication 600 MILLIGRAM(S): at 18:48

## 2019-06-17 RX ADMIN — Medication 600 MILLIGRAM(S): at 18:08

## 2019-06-17 RX ADMIN — Medication 1 TABLET(S): at 12:36

## 2019-06-17 RX ADMIN — SODIUM CHLORIDE 3 MILLILITER(S): 9 INJECTION INTRAMUSCULAR; INTRAVENOUS; SUBCUTANEOUS at 23:16

## 2019-06-17 NOTE — PROGRESS NOTE ADULT - SUBJECTIVE AND OBJECTIVE BOX
Postpartum Note Vaginal Delivery  She is a  23y woman who is now postpartum day 1    Subjective:  The patient feels well.  She is ambulating and tolerating a diet  She is having bowel movements and flatus  She reports no breathing problems  She reports no headache or visual changes  She reports normal postpartum bleeding    Physical exam:  She generally looks and feels well    Vital Signs Last 24 Hrs  T(C): 36.5 (2019 23:30), Max: 36.9 (2019 19:33)  T(F): 97.7 (2019 23:30), Max: 98.4 (2019 19:33)  HR: 64 (2019 23:30) (64 - 117)  BP: 107/61 (2019 23:30) (102/66 - 133/90)  BP(mean): --  RR: 18 (2019 23:30) (15 - 19)  SpO2: 99% (2019 22:25) (93% - 100%)    Lungs: Normal  Heart: Regular rate and rhythm  Abdomen: Soft, nontender, no distension , firm uterine fundus  Pelvic: Normal lochia noted  Ext: No DVT signs, warm extremities, normal pulses    Allergies    No Known Drug Allergies  peanuts (Rash)    Intolerances      MEDICATIONS  (STANDING):  acetaminophen   Tablet .. 975 milliGRAM(s) Oral every 6 hours  diphtheria/tetanus/pertussis (acellular) Vaccine (ADAcel) 0.5 milliLiter(s) IntraMuscular once  ibuprofen  Tablet. 600 milliGRAM(s) Oral every 6 hours  oxytocin Infusion 333.333 milliUNIT(s)/Min (1000 mL/Hr) IV Continuous <Continuous>  prenatal multivitamin 1 Tablet(s) Oral daily  sodium chloride 0.9% lock flush 3 milliLiter(s) IV Push every 8 hours    MEDICATIONS  (PRN):  benzocaine 20%/menthol 0.5% Spray 1 Spray(s) Topical every 6 hours PRN for Perineal discomfort  dibucaine 1% Ointment 1 Application(s) Topical every 6 hours PRN Perineal discomfort  diphenhydrAMINE 25 milliGRAM(s) Oral every 6 hours PRN Pruritus  docusate sodium 100 milliGRAM(s) Oral two times a day PRN For stool softening  glycerin Suppository - Adult 1 Suppository(s) Rectal at bedtime PRN Constipation  hydrocortisone 1% Cream 1 Application(s) Topical every 6 hours PRN Moderate Pain (4-6)  lanolin Ointment 1 Application(s) Topical every 6 hours PRN nipple soreness  magnesium hydroxide Suspension 30 milliLiter(s) Oral two times a day PRN Constipation  oxyCODONE    IR 5 milliGRAM(s) Oral every 3 hours PRN Moderate Pain (4 - 6)  oxyCODONE    IR 5 milliGRAM(s) Oral once PRN Severe Pain (7 -10)  pramoxine 1%/zinc 5% Cream 1 Application(s) Topical every 4 hours PRN Moderate Pain (4-6)  simethicone 80 milliGRAM(s) Chew every 4 hours PRN Gas  witch hazel Pads 1 Application(s) Topical every 4 hours PRN Perineal discomfort      LABS:                        7.9    x     )-----------( x        ( 2019 07:19 )             25.5             Urinalysis Basic - ( 2019 20:08 )    Color: Yellow / Appearance: Clear / S.010 / pH: x  Gluc: x / Ketone: Negative  / Bili: Negative / Urobili: Negative mg/dL   Blood: x / Protein: 15 mg/dL / Nitrite: Negative   Leuk Esterase: Small / RBC: Negative /HPF / WBC 0-2   Sq Epi: x / Non Sq Epi: Occasional / Bacteria: x

## 2019-06-18 ENCOUNTER — TRANSCRIPTION ENCOUNTER (OUTPATIENT)
Age: 24
End: 2019-06-18

## 2019-06-18 VITALS
TEMPERATURE: 98 F | DIASTOLIC BLOOD PRESSURE: 62 MMHG | HEART RATE: 58 BPM | RESPIRATION RATE: 18 BRPM | SYSTOLIC BLOOD PRESSURE: 97 MMHG

## 2019-06-18 LAB
MEV IGG SER-ACNC: 39.9 AU/ML — SIGNIFICANT CHANGE UP
MEV IGG+IGM SER-IMP: POSITIVE — SIGNIFICANT CHANGE UP
T PALLIDUM AB TITR SER: NEGATIVE — SIGNIFICANT CHANGE UP

## 2019-06-18 RX ORDER — IBUPROFEN 200 MG
1 TABLET ORAL
Qty: 21 | Refills: 1
Start: 2019-06-18 | End: 2019-07-01

## 2019-06-18 RX ADMIN — SODIUM CHLORIDE 3 MILLILITER(S): 9 INJECTION INTRAMUSCULAR; INTRAVENOUS; SUBCUTANEOUS at 06:02

## 2019-06-18 RX ADMIN — Medication 600 MILLIGRAM(S): at 06:31

## 2019-06-18 RX ADMIN — Medication 600 MILLIGRAM(S): at 06:00

## 2019-06-18 NOTE — DISCHARGE NOTE OB - MEDICATION SUMMARY - MEDICATIONS TO TAKE
I will START or STAY ON the medications listed below when I get home from the hospital:    prenatals  -- once a day  -- Indication: For prior med    ibuprofen 600 mg oral tablet  -- 1 tab(s) by mouth every 8 hours  -- Do not take this drug if you are pregnant.  It is very important that you take or use this exactly as directed.  Do not skip doses or discontinue unless directed by your doctor.  May cause drowsiness or dizziness.  Obtain medical advice before taking any non-prescription drugs as some may affect the action of this medication.  Take with food or milk.    -- Indication: For pain

## 2019-06-18 NOTE — DISCHARGE NOTE OB - PATIENT PORTAL LINK FT
You can access the [email or web address], offered by [facility], by registering with the following website: [email or web address]

## 2019-06-18 NOTE — DISCHARGE NOTE OB - CARE PROVIDER_API CALL
Valentino Montenegro)  Obstetrics and Gynecology  [address] [place] [address], [address] [place] [address]  Phone: [phone]  Fax: [phone]  Follow Up Time:

## 2019-06-18 NOTE — PROGRESS NOTE ADULT - SUBJECTIVE AND OBJECTIVE BOX
Postpartum Note Vaginal Delivery  She is a  23y woman who is now postpartum day 2    Subjective:  The patient feels well.  She is ambulating and tolerating a diet  She is having bowel movements and flatus  She reports no breathing problems  She reports no headache or visual changes  She reports normal postpartum bleeding    Physical exam:  She generally looks and feels well    Vital Signs Last 24 Hrs  T(C): 36.7 (2019 04:28), Max: 36.8 (2019 08:24)  T(F): 98.1 (2019 04:28), Max: 98.2 (2019 08:24)  HR: 61 (2019 04:28) (61 - 74)  BP: 99/60 (2019 04:28) (96/61 - 99/67)  BP(mean): --  RR: 18 (2019 04:28) (18 - 18)  SpO2: 98% (2019 04:28) (98% - 98%)    Lungs: Normal  Heart: Regular rate and rhythm  Abdomen: Soft, nontender, no distension , firm uterine fundus  Pelvic: Normal lochia noted  Ext: No DVT signs, warm extremities, normal pulses    Allergies    No Known Drug Allergies  peanuts (Rash)    Intolerances      MEDICATIONS  (STANDING):  acetaminophen   Tablet .. 975 milliGRAM(s) Oral every 6 hours  ibuprofen  Tablet. 600 milliGRAM(s) Oral every 6 hours  oxytocin Infusion 333.333 milliUNIT(s)/Min (1000 mL/Hr) IV Continuous <Continuous>  prenatal multivitamin 1 Tablet(s) Oral daily  sodium chloride 0.9% lock flush 3 milliLiter(s) IV Push every 8 hours    MEDICATIONS  (PRN):  benzocaine 20%/menthol 0.5% Spray 1 Spray(s) Topical every 6 hours PRN for Perineal discomfort  dibucaine 1% Ointment 1 Application(s) Topical every 6 hours PRN Perineal discomfort  diphenhydrAMINE 25 milliGRAM(s) Oral every 6 hours PRN Pruritus  docusate sodium 100 milliGRAM(s) Oral two times a day PRN For stool softening  glycerin Suppository - Adult 1 Suppository(s) Rectal at bedtime PRN Constipation  hydrocortisone 1% Cream 1 Application(s) Topical every 6 hours PRN Moderate Pain (4-6)  lanolin Ointment 1 Application(s) Topical every 6 hours PRN nipple soreness  magnesium hydroxide Suspension 30 milliLiter(s) Oral two times a day PRN Constipation  oxyCODONE    IR 5 milliGRAM(s) Oral every 3 hours PRN Moderate Pain (4 - 6)  oxyCODONE    IR 5 milliGRAM(s) Oral once PRN Severe Pain (7 -10)  pramoxine 1%/zinc 5% Cream 1 Application(s) Topical every 4 hours PRN Moderate Pain (4-6)  simethicone 80 milliGRAM(s) Chew every 4 hours PRN Gas  witch hazel Pads 1 Application(s) Topical every 4 hours PRN Perineal discomfort      LABS:                        7.9    x     )-----------( x        ( 2019 07:19 )             25.5             Urinalysis Basic - ( 2019 20:08 )    Color: Yellow / Appearance: Clear / S.010 / pH: x  Gluc: x / Ketone: Negative  / Bili: Negative / Urobili: Negative mg/dL   Blood: x / Protein: 15 mg/dL / Nitrite: Negative   Leuk Esterase: Small / RBC: Negative /HPF / WBC 0-2   Sq Epi: x / Non Sq Epi: Occasional / Bacteria: x        RADIOLOGY & ADDITIONAL TESTS:

## 2019-06-18 NOTE — DISCHARGE NOTE OB - MATERIALS PROVIDED
Guide to Postpartum Care/[facility] Hearing Screen Program/Tdap Vaccination (VIS Pub Date: 2012)/[facility]  Screening Program/Shaken Baby Prevention Handout/Back To Sleep Handout/Breastfeeding Log

## 2019-06-18 NOTE — DISCHARGE NOTE OB - CARE PLAN
Principal Discharge DX:	 (normal spontaneous vaginal delivery)  Goal:	pain free  Assessment and plan of treatment:	Follow up in four weeks in our office. Please call sooner if there are any concerns.

## 2019-07-12 NOTE — OB RN PATIENT PROFILE - NS_PRENATALHARD_OBGYN_ALL_OB
DERMATOLOGY PROGRESS NOTE  Follow up    7/12/2019  Noris Mahmood  1940  MRN: [id number]   Last Visit:7/9/2019      Noris Mahmood is a 79 year old female patient w complicated med hx including cutaneous lupus, sarcoidosis (pulmonary), sjogren's, seen in follow up for blistering lesion on left upper arm. And rash on neck and back. The rash is spreading today, malar rash, ears, neck, chest, b/l arms, are now involved, with increased involvement on the back now. Does feel tired but no oral ulcers, no chest pain, no shortness of breath, no joint pain worse than usual, is not sure if rash is worse w sun. Plaquenil was decreased 11/2018 due to the patients weight. Onset of this eruption was mid June, a couple of days following a total knee replacement. Developed a red blistering plaque of the left upper arm. Also red plaques, one blistered, to the mid upper and right lower back. Getting many new smaller erythematous papules on the back as well. Using betamethasone and keflex to treat. Denies pain or itch. No eye pain, mouth pain, difficulty swallowing, difficulty or pain w urination. No fever or chills. Otherwise denies lesions that are new, changing, itchy, painful, bleeding, crusting, or otherwise bothersome. Feels otherwise well, no other cutaneous concerns today.     Skin Cancer History  History of skin cancer--Negative  Autoimmune hx: + cutaneous lupus, sarcoidosis (pulmonary), sjogrens    PAST, FAMILY, SOCIAL HISTORY:  Family history of skin cancer--No family history of skin cancer  History of severe sunburns--Generalized  Sunscreens--negative  Occupation--retired  Lives with--Family      Family History   Problem Relation Age of Onset   • Heart disease Mother         CHF   • Vision Loss Mother    • Cancer Father         Lung   • COPD Father    • Hypertension Father    • Kidney disease Brother         Kidney Failure   • Other Sister         DVT, PE         Social History     Tobacco Use   • Smoking status: Never  Smoker   • Smokeless tobacco: Never Used   Substance Use Topics   • Alcohol use: No     Alcohol/week: 0.0 oz   • Drug use: No       Past Medical History:   Diagnosis Date   • Anemia 2011   • Arthritis     Knee pain   • Blood clot associated with vein wall inflammation 2011    back right leg   • Chronic kidney disease 2011    ARF   • Cutaneous lupus erythematosus 2/7/2013   • DVT (deep venous thrombosis) (CMS/[facility]) 2011    Back Right Leg   • Essential (primary) hypertension    • Hypercalcemia 2011   • Hyperglycemia     intermittent   • Long-term use of immunosuppressant medication 2/7/2013   • Osteoporosis, unspecified 12/06/2011   • PONV (postoperative nausea and vomiting)    • Pulmonary nodule 6/19/2013   • Pulmonary sarcoidosis (CMS/[facility])    • RLS (restless legs syndrome)    • RSV (respiratory syncytial virus infection) 2011    hospitalized   • Sjogren's disease (CMS/[facility]) 2/7/2013   • Thyroid condition     underacitive thyroid   • Wears glasses        ALLERGIES:   Allergen Reactions   • Codeine NAUSEA   • Levaquin RASH   • Oxycodone VISUAL DISTURBANCE     Hallucinations   • Fosamax GI UPSET and MYALGIA   • Tramadol Other (See Comments)     unsteadiness       Current Outpatient Medications   Medication Sig Dispense Refill   • warfarin (COUMADIN) 1 MG tablet Take 2 tablets daily as needed for anticoaglulation  (directions may change daily depending on INR results) 100 tablet 0   • cephalexin (KEFLEX) 500 MG capsule Take 1 capsule by mouth 4 times daily for 10 days. 40 capsule 0   • betamethasone dipropionate (DIPROSONE) 0.05 % ointment Apply to affected area every 12 hours for no more than 14 days in a row. 45 g 0   • HYDROcodone-acetaminophen (NORCO) 5-325 MG per tablet Take 1-2 tablets by mouth every 4 hours as needed for Pain. 60 tablet 0   • warfarin (COUMADIN) 2 MG tablet Take 1 tablet by mouth daily. 90 tablet 0   • docusate sodium-sennosides (SENOKOT S) 50-8.6 MG per tablet Take 1 tablet by mouth daily. 30  tablet 0   • prochlorperazine (COMPAZINE) 5 MG tablet Take 1 tablet by mouth every 6 hours as needed for Nausea. 30 tablet 0   • GLUCOSAMINE-CHONDROITIN PO Take 1 tablet by mouth daily.     • hydroxychloroquine (PLAQUENIL) 200 MG tablet TAKE 2 TABLETS by mouth daily MONDAY - WEDNESDAY, then 1 TABLET daily THURSDAY - Sunday (Patient taking differently: Take 200 mg by mouth daily. ) 120 tablet 1   • levothyroxine (SYNTHROID, LEVOTHROID) 50 MCG tablet Take 1 tablet by mouth daily. 90 tablet 4   • pramipexole (MIRAPEX) 0.25 MG tablet TAKE 1 TABLET by mouth NIGHTLY at bedtime 90 tablet 1   • furosemide (LASIX) 20 MG tablet TAKE ONE TABLET BY MOUTH EVERY OTHER DAY (Patient taking differently: TAKE ONE TABLET BY TWO TIMES WEEKLY) 15 tablet 10   • cloNIDine (CATAPRES) 0.2 MG tablet TAKE ONE TABLET BY MOUTH TWICE DAILY 180 tablet 3   • polyethylene glycol-propylene glycol (SYSTANE) 0.4-0.3 % Solution Apply to eye 2 times daily.     • Denosumab (PROLIA SC) Injection 2 times a year     • cycloSPORINE (RESTASIS) 0.05 % ophthalmic emulsion Place 1 drop into both eyes as needed. Indications: dry eyes      • CALCIUM CARBONATE-VITAMIN D PO Take 1 tablet by mouth 2 times daily.      • Multiple Vitamins-Minerals (CENTRUM SILVER ULTRA WOMENS) TABS Take 1 tablet by mouth Every evening.  30 tablet    • Omega-3 Fatty Acids (FISH OIL) 1000 MG capsule Take 1 g by mouth daily.       No current facility-administered medications for this visit.        Visit Vitals  Pulse 82   Resp 16   SpO2 92%       REVIEW OF SYSTEMS:  Negative for fevers, chills, failure to thrive, fatigue, decreased appetite, weight loss, cough, dizziness, weakness, nausea, vomiting, diarrhea, joint pain, hematochezia, or hematuria.  She is otherwise well without other skin concerns.    EXAM:   The patient is a well developed  female is alert and oriented x 3, normal mood and affect  and is in no acute distress.    Skin exam -- Thorough evaluation of head  (including face and scalp), eyelids, conjunctiva, ears, lips, oral exam of lips vermillion and labial mucosa, buccal mucosa, floor of the mouth, tongue, hard palate, gingivae, neck,  chest, back, abdomen, buttocks bilateral upper extremities, digits and nails, per pt preference (refused TBSE), notable for:  - left upper arm with large erythematous plaque and central flaccid bullae, large area of heme crust overlying part of the bullae  - right upper arm w 2 erythematous plaques w central erosions  - left upper back with pink raised plaque w surrounding smaller papules  - right lower back with erythematous plaque and central flaccid ruptured bullae  - face, b/l helical rims, chest, posterior neck, back, b/l forearms with many erythematous papules and plaques  - Rest of exam unremarkable    Pathology reviewed:  Pathologic Diagnosis :     A: Skin, upper mid back, punch biopsy:     - Interface dermatitis with epidermal necrosis, see comment.         B: Skin, upper mid back, punch biopsy for direct immunofluorescence testing:     - Globular deposition of IgA, IgM, and C3, see comment.         Diagnosis Comment:     The changes in the routinely stained specimen are compatible with an interface     dermatitis, and the presence of increased reticular dermal mucin deposition is     highly suspicious for a lesion along the spectrum of a connective tissue     disorder. I do note a positive history of lupus in this patient. Although the     direct immunofluorescence testing findings are not specific, lesional tissue     was submitted, and the finding of globular deposition of antibodies is     compatible with an interface process. Eosinophils are absent, arguing against     a drug etiology or allergic/contact lichenoid dermatitis. Also, changes of an     autoimmune bullous disorder are absent.       CBC: notable for normocytic anemia, CRE very slightly elevated      IMPRESSION / PLAN:   Ms. Mahmood has:    1) Concern for  transformation to SLE/bullous lupus aleksandra given malar and facial/helical involvement, although would be unusual given pts age. As has been ongoing since mid June time course does not fit for TENS spectrum and NO mucosal involvement, although they have been counseled to seek immediate medical attention if but not limited to eye pain, mucosal involvement, feeling unwell, or worsening rash were to develop  - d/w pt and  in detail  - after discussing w patient's rheumatologist, appreciate assistance, recommended methotrexate 15 mg PO weekly and folic acid 1 mg daily, however pt refused this prescription stating she thinks she had a reaction to this medication in the past, and does not want to take it  - RUDOLPH, C3/C4, antiphospholipid/anticardiolipin/beta abs ordered--pt advised to have these drawn after leaving clinic today, she and her  endorse they will go directly to the lab from the appointment, they do need to reschedule a physical therapy appointment  - ILK injected 1 cc of 40 mg/cc to right buttock,[id number], [id number] and EXP nov 2019  - pts given home contact info for MD should they have questions or concerns over the weekend  - monitor for changes  - call if changes occur    2) Suture removal  - dw pt, 2 sutures removed, skin was tender and ice packs used to help w pain w removing  - results as above      Return to clinic in:  1 weeks, sooner prn    The documentation recorded by the scribe Lata Gonzalez accurately and completely reflects the service(s) I personally performed and the decisions made by me    Dalila Mccloud MD  Dermatology    I, Lata Gonzalez MA, attest that I performed the duties of a scribe for this entire encounter in the presence of Dr. Mccloud     Available

## 2019-10-31 PROCEDURE — 86900 BLOOD TYPING SEROLOGIC ABO: CPT

## 2019-10-31 PROCEDURE — 86765 RUBEOLA ANTIBODY: CPT

## 2019-10-31 PROCEDURE — T1013: CPT

## 2019-10-31 PROCEDURE — 85027 COMPLETE CBC AUTOMATED: CPT

## 2019-10-31 PROCEDURE — 36415 COLL VENOUS BLD VENIPUNCTURE: CPT

## 2019-10-31 PROCEDURE — 86780 TREPONEMA PALLIDUM: CPT

## 2019-10-31 PROCEDURE — 85014 HEMATOCRIT: CPT

## 2019-10-31 PROCEDURE — 90715 TDAP VACCINE 7 YRS/> IM: CPT

## 2019-10-31 PROCEDURE — 86901 BLOOD TYPING SEROLOGIC RH(D): CPT

## 2019-10-31 PROCEDURE — 85018 HEMOGLOBIN: CPT

## 2019-10-31 PROCEDURE — 81001 URINALYSIS AUTO W/SCOPE: CPT

## 2019-10-31 PROCEDURE — 86850 RBC ANTIBODY SCREEN: CPT

## 2019-11-05 NOTE — PROGRESS NOTE ADULT - ASSESSMENT
Assessment and Plan    Day  1  Vaginal Delivery  She feels well  Continue the current pain medication  Encourage  Ambulation  Encourage regular diet   DVT ppx: SCDs only when not ambulating  She is stable, tolerates a diet and has normal flatus and bowel movements  She will be discharged on Day 2 according to the normal criteria.    HCT low but pt is asymptomatic Repair Performed By Another Provider Text (Leave Blank If You Do Not Want): After the tissue was excised the defect was repaired by another provider.

## 2021-01-08 NOTE — OB RN DELIVERY SUMMARY - NS_ADMITROM_OBGYN_ALL_OB
January 8, 2021       Rajeev Beavers, DO  [id number] [facility] [id number]  Via In Basket      Patient: Misael Moss   YOB: 1955   Date of Visit: 1/8/2021       Dear Dr. Beavers:    Thank you for referring Misael Moss to me for evaluation. Below are my notes for this visit with him.    If you have questions, please do not hesitate to call me. I look forward to following your patient along with you.      Sincerely,        Gayle Snider MD        CC: No Recipients  Gayle Snider MD  1/8/2021  4:33 PM  Signed  ALLERGIES:  No Known Allergies    CC:  Rash    HISTORY OF PRESENT ILLNESS:  The patient is a 65 year old male here for rash.  Location: whole body   Duration: 3-4 months   Previous treatments: Benadryl, Benadryl cream (no relief)  Current soap: Maltese spring  Current moisturizer: CeraVe cream   Other products applied to the skin: Remedy     No Yes  []  [x]  Itching  []  [x]  Pain   []  [x]  New medications prior to onset of rash  [x]  []  Fevers  [x]  []  Personal history of psoriasis or eczema  [x]  []  Family history of psoriasis or eczema    Past Medical History:   Diagnosis Date   • Acute deep vein thrombosis (DVT) of lower extremity, unspecified laterality, unspecified vein (CMS/HCC) 11/23/2016   • Benign neoplasm of colon 6/3/16   • CAD, multiple vessel 5/4/2016 12/12/08 [facility]/  Mid LAD 2.5X12mm mini-Vision/  Mid RCA 3.0X12mm Liberte/  L Cfx 2.14B93ig Liberte/  L subclavian artery Promus 8X27mm EB3.     • Calculus of gallbladder without cholecystitis without obstruction 4/1/2016   • COPD, mild (CMS/HCC) 4/1/2016   • Diabetes (CMS/HCC) 2013    Adult onset   • Family history of malignant neoplasm of gastrointestinal tract 6/3/2016    Colonoscopy/[facility]/5 yr recall; 6/2021   • History of PTCA 2 2007    Has three stents   • Hyperlipidemia 3/15/2016   • Non-small cell lung cancer, left (CMS/HCC) 7/27/2016   • Nonalcoholic steatohepatitis (VARGAS) 4/1/2016   • PVD  (peripheral vascular disease) (CMS/[facility]) 05/04/2016 07/20/09 Right common and external iliac endarterectomy with patch angioplasty and eversion internal iliac endarterectomy.     • Type 2 diabetes mellitus without complication (CMS/[facility]) 3/15/2016       Current Outpatient Medications   Medication   • levothyroxine 50 MCG tablet   • megestrol (MEGACE) 40 mg/mL suspension   • linaclotide (Linzess) 290 MCG   • oxyCODONE ER 27 MG Capsule Extended Release 12 hour Abuse-Deterrent   • omega-3 acid ethyl esters (LOVAZA) 1 g capsule   • PEG 3350-KCl-NaBcb-NaCl-NaSulf (PEG-3350 and electrolytes) 236 g powder for oral solution   • oxyCODONE, IMM REL, (ROXICODONE) 5 MG immediate release tablet   • tamsulosin (FLOMAX) 0.4 MG Cap   • Methylnaltrexone Bromide 150 MG Tab   • midodrine (PROAMATINE) 2.5 MG tablet   • metoPROLOL succinate (TOPROL-XL) 25 MG 24 hr tablet   • rivaroxaban (XARELTO) 20 MG Tab   • aspirin 81 MG chewable tablet   • lidocaine-prilocaine (EMLA) cream   • bisacodyl (DULCOLAX) 10 MG suppository   • albuterol (PROAIR HFA) 108 (90 Base) MCG/ACT inhaler   • digoxin (LANOXIN) 0.125 MG tablet   • nitroGLYcerin (NITROSTAT) 0.4 MG sublingual tablet   • blood glucose (ONE TOUCH ULTRA TEST) test strip   • Blood Glucose Monitoring Suppl (ONE TOUCH ULTRA 2) w/Device Kit   • ONETOUCH DELICA LANCETS 33G Misc   • Spacer/Aero Chamber Mouthpiece Misc   • Cholecalciferol (VITAMIN D) 1000 UNITS capsule     No current facility-administered medications for this visit.        EXAM:  The patient is pleasant, well appearing, no distress.  Mood and affect are appropriate.  Oriented to person, place, time.  There is generalized xerosis does ill-defined scaly patches in a few edematous papules located on the back.  Xerosis of the lower legs    IMPRESSION AND PLAN:  Pruritus, etiology unclear, many potential etiologies.  There is dermatitis present on his back and I recommend biopsy although he declines.  He will start triamcinolone 0.1%  ointment b.i.d..  Zyrtec 10mg po daily.  Emollients daily.  Other potential underlying etiologies include drug-induced (oxycodone), drug hypersensitivity, paraneoplastic.      Written advice regarding photoprotection to reduce skin cancer risk and advice on safe vitamin D sources provided.      FOLLOW UP:  2-3 weeks         CC:  Rajeev Beavers DO     On 1/8/2021, Daisha SOSA CMA scribed the services personally performed by Gayle Snider MD  The documentation recorded by the scribe accurately and completely reflects the service(s) I personally performed and the decisions made by me.                         No

## 2022-01-01 NOTE — OB RN PATIENT PROFILE - ABORTIONS, OB PROFILE
Caroline is doing very well!  I strongly recommend vaccinations for her.  We do not accept Jainism exemptions.  We will allow 2 more visits, within 1-2 weeks of eachother and today, to discuss any questions or concerns you may have about vaccination, but will require the vaccines to start by then.  I recommend she be seen by another pediatrician if she will not follow here any longer, by 1 month of age or sooner if any concerns were to arise.    .No water, juice, tea or solids for 6 mths., no honey for 1 year  Give DDrops 1 drop daily directly into the baby's mouth, or DiViSol/PolyViSol 1 mL daily (do not mix with breastmilk/formula)  Take to the ER if rectal temperature is at or over 100.4 F (38.0 C), if increase in yellowness/jaundice, poor feeding, not pooping or peeing as much as normal, any abnormal movement, or other illness symptoms  Please purchase a rectal thermometer.  5 S’s for soothing a crying baby: shh-ing, swaying, swaddling, sidelining, sucking; never shake a crying baby.  If needed, place the baby in a safe place or with another caregiver and walk away to de-stress  Keep home water heater no higher than 120 degrees F  Place on baby's back to sleep in a crib or bassinet with no extra blankets, stuffed animals or bumpers.  Tummy time several times per day while awake and supervised only.  No tub baths until the umbilical cord falls off.  No need to use alcohol wipes or special cleaning of the cord area unless stool gets on it  Good hand hygiene for all caregivers and everyone who holds the baby!  Use a rear facing carseat until age 2 years and the highest length/weight is reached, per the   Keep all firearms unloaded and locked up separately from locked up ammunition   0

## 2022-09-16 NOTE — DISCHARGE NOTE OB - HOSPITAL COURSE
20 yo now   at 39 weeks came in with with LOF  There were no complications during the pregnancy   Pt had vaginal delivery with vacuum assist and episiotomy on 17     Pt was transferred to [facility] [place].   While on the floor patient ate, urinated WNL, ambulated, and pain was well managed.     Pt to be discharged home.   Follow up in [facility] in 6 weeks for postpartum check.    D/C home on  PNV, motrin Satisfactory

## 2023-01-16 NOTE — OB RN PATIENT PROFILE - NS_MODEOFARRIVAL_OBGYN_ALL_OB
[facility] PROGRESS NOTE        Subjective     Nora is a 28 year old here for Office Visit and Ear Problem (Left ear- little pressure- about a week ago )     Left ear concern:   Patient has had some pressure/clicking about 1 week or 1.5 weeks ago.   Denies pain, drainage.   Denies previous known trauma to the tympanic membrane.   Denies hearing changes.   Denies swimming in pool, hot tub.   Endorses h/o TMJ.     Social Determinants Never Smoker    Objective   Vitals:    01/16/23 0747   BP: 98/68   Pulse: 78   Temp: 97.4 °F (36.3 °C)   TempSrc: Tympanic   SpO2: 98%   Weight: 85.3 kg (188 lb)   Height: 5' 8\" (1.727 m)     Physical Exam  Vitals reviewed.   Constitutional:       Appearance: Normal appearance.   HENT:      Right Ear: There is impacted cerumen.      Left Ear: Tympanic membrane, ear canal and external ear normal. No drainage, swelling or tenderness.      Mouth/Throat:      Mouth: Mucous membranes are moist.      Pharynx: Oropharynx is clear. No oropharyngeal exudate or posterior oropharyngeal erythema.   Eyes:      General: No scleral icterus.     Conjunctiva/sclera: Conjunctivae normal.   Pulmonary:      Effort: Pulmonary effort is normal. No respiratory distress.   Neurological:      General: No focal deficit present.      Mental Status: She is alert. Mental status is at baseline.   Psychiatric:         Mood and Affect: Mood normal.         Behavior: Behavior normal.                ASSESSMENT AND PLAN       1. Pressure sensation in left ear  Unsure underlying cause as ear is normal appearing on exam. Start flonase daily for several weeks. Discussed concerning signs and symptoms, reasons to seek medical attention. Encouraged Patient to let us know if there are any new symptoms or concerns.   -     fluticasone (FLONASE) 50 MCG/ACT nasal spray; Spray 2 sprays in each nostril daily.  2. Impacted cerumen of right ear  -     carbamide peroxide (DEBROX) 6.5 % otic solution; Place 5 drops into right ear in the  morning and 5 drops in the evening. Indications: Removal of Wax From the Ear.      Follow Up  Return if symptoms worsen or fail to improve.         Car

## 2023-03-05 ENCOUNTER — EMERGENCY (EMERGENCY)
Facility: HOSPITAL | Age: 28
LOS: 1 days | Discharge: HOME | End: 2023-03-05
Attending: EMERGENCY MEDICINE
Payer: MEDICAID

## 2023-03-05 VITALS
TEMPERATURE: 98 F | HEART RATE: 82 BPM | WEIGHT: 115.08 LBS | DIASTOLIC BLOOD PRESSURE: 64 MMHG | SYSTOLIC BLOOD PRESSURE: 108 MMHG | RESPIRATION RATE: 18 BRPM | HEIGHT: 61 IN | OXYGEN SATURATION: 98 %

## 2023-03-05 VITALS
TEMPERATURE: 98 F | DIASTOLIC BLOOD PRESSURE: 64 MMHG | OXYGEN SATURATION: 98 % | SYSTOLIC BLOOD PRESSURE: 104 MMHG | HEART RATE: 68 BPM | RESPIRATION RATE: 17 BRPM

## 2023-03-05 LAB
ALBUMIN SERPL ELPH-MCNC: 4 G/DL — SIGNIFICANT CHANGE UP (ref 3.3–5.2)
ALP SERPL-CCNC: 66 U/L — SIGNIFICANT CHANGE UP (ref 40–120)
ALT FLD-CCNC: 18 U/L — SIGNIFICANT CHANGE UP
ANION GAP SERPL CALC-SCNC: 10 MMOL/L — SIGNIFICANT CHANGE UP (ref 5–17)
AST SERPL-CCNC: 23 U/L — SIGNIFICANT CHANGE UP
BASOPHILS # BLD AUTO: 0.06 K/UL — SIGNIFICANT CHANGE UP (ref 0–0.2)
BASOPHILS NFR BLD AUTO: 0.5 % — SIGNIFICANT CHANGE UP (ref 0–2)
BILIRUB SERPL-MCNC: 0.7 MG/DL — SIGNIFICANT CHANGE UP (ref 0.4–2)
BUN SERPL-MCNC: 11.7 MG/DL — SIGNIFICANT CHANGE UP (ref 8–20)
CALCIUM SERPL-MCNC: 8.5 MG/DL — SIGNIFICANT CHANGE UP (ref 8.4–10.5)
CHLORIDE SERPL-SCNC: 103 MMOL/L — SIGNIFICANT CHANGE UP (ref 96–108)
CO2 SERPL-SCNC: 24 MMOL/L — SIGNIFICANT CHANGE UP (ref 22–29)
CREAT SERPL-MCNC: 0.46 MG/DL — LOW (ref 0.5–1.3)
EGFR: 134 ML/MIN/1.73M2 — SIGNIFICANT CHANGE UP
EOSINOPHIL # BLD AUTO: 0.12 K/UL — SIGNIFICANT CHANGE UP (ref 0–0.5)
EOSINOPHIL NFR BLD AUTO: 1 % — SIGNIFICANT CHANGE UP (ref 0–6)
GLUCOSE SERPL-MCNC: 100 MG/DL — HIGH (ref 70–99)
HCG SERPL-ACNC: <4 MIU/ML — SIGNIFICANT CHANGE UP
HCT VFR BLD CALC: 39.4 % — SIGNIFICANT CHANGE UP (ref 34.5–45)
HGB BLD-MCNC: 12.9 G/DL — SIGNIFICANT CHANGE UP (ref 11.5–15.5)
IMM GRANULOCYTES NFR BLD AUTO: 0.3 % — SIGNIFICANT CHANGE UP (ref 0–0.9)
LIDOCAIN IGE QN: 22 U/L — SIGNIFICANT CHANGE UP (ref 22–51)
LYMPHOCYTES # BLD AUTO: 1.04 K/UL — SIGNIFICANT CHANGE UP (ref 1–3.3)
LYMPHOCYTES # BLD AUTO: 8.7 % — LOW (ref 13–44)
MCHC RBC-ENTMCNC: 26.9 PG — LOW (ref 27–34)
MCHC RBC-ENTMCNC: 32.7 GM/DL — SIGNIFICANT CHANGE UP (ref 32–36)
MCV RBC AUTO: 82.3 FL — SIGNIFICANT CHANGE UP (ref 80–100)
MONOCYTES # BLD AUTO: 0.57 K/UL — SIGNIFICANT CHANGE UP (ref 0–0.9)
MONOCYTES NFR BLD AUTO: 4.8 % — SIGNIFICANT CHANGE UP (ref 2–14)
NEUTROPHILS # BLD AUTO: 10.12 K/UL — HIGH (ref 1.8–7.4)
NEUTROPHILS NFR BLD AUTO: 84.7 % — HIGH (ref 43–77)
PLATELET # BLD AUTO: 213 K/UL — SIGNIFICANT CHANGE UP (ref 150–400)
POTASSIUM SERPL-MCNC: 3.9 MMOL/L — SIGNIFICANT CHANGE UP (ref 3.5–5.3)
POTASSIUM SERPL-SCNC: 3.9 MMOL/L — SIGNIFICANT CHANGE UP (ref 3.5–5.3)
PROT SERPL-MCNC: 7.1 G/DL — SIGNIFICANT CHANGE UP (ref 6.6–8.7)
RBC # BLD: 4.79 M/UL — SIGNIFICANT CHANGE UP (ref 3.8–5.2)
RBC # FLD: 14.3 % — SIGNIFICANT CHANGE UP (ref 10.3–14.5)
SODIUM SERPL-SCNC: 137 MMOL/L — SIGNIFICANT CHANGE UP (ref 135–145)
WBC # BLD: 11.94 K/UL — HIGH (ref 3.8–10.5)
WBC # FLD AUTO: 11.94 K/UL — HIGH (ref 3.8–10.5)

## 2023-03-05 PROCEDURE — 80053 COMPREHEN METABOLIC PANEL: CPT

## 2023-03-05 PROCEDURE — 83690 ASSAY OF LIPASE: CPT

## 2023-03-05 PROCEDURE — 84702 CHORIONIC GONADOTROPIN TEST: CPT

## 2023-03-05 PROCEDURE — 99284 EMERGENCY DEPT VISIT MOD MDM: CPT | Mod: 25

## 2023-03-05 PROCEDURE — 99284 EMERGENCY DEPT VISIT MOD MDM: CPT

## 2023-03-05 PROCEDURE — 36415 COLL VENOUS BLD VENIPUNCTURE: CPT

## 2023-03-05 PROCEDURE — 85025 COMPLETE CBC W/AUTO DIFF WBC: CPT

## 2023-03-05 PROCEDURE — 76705 ECHO EXAM OF ABDOMEN: CPT

## 2023-03-05 PROCEDURE — 76705 ECHO EXAM OF ABDOMEN: CPT | Mod: 26

## 2023-03-05 PROCEDURE — 96375 TX/PRO/DX INJ NEW DRUG ADDON: CPT

## 2023-03-05 PROCEDURE — T1013: CPT

## 2023-03-05 PROCEDURE — 96374 THER/PROPH/DIAG INJ IV PUSH: CPT

## 2023-03-05 RX ORDER — ONDANSETRON 8 MG/1
4 TABLET, FILM COATED ORAL ONCE
Refills: 0 | Status: COMPLETED | OUTPATIENT
Start: 2023-03-05 | End: 2023-03-05

## 2023-03-05 RX ORDER — KETOROLAC TROMETHAMINE 30 MG/ML
15 SYRINGE (ML) INJECTION ONCE
Refills: 0 | Status: DISCONTINUED | OUTPATIENT
Start: 2023-03-05 | End: 2023-03-05

## 2023-03-05 RX ADMIN — Medication 15 MILLIGRAM(S): at 05:10

## 2023-03-05 RX ADMIN — ONDANSETRON 4 MILLIGRAM(S): 8 TABLET, FILM COATED ORAL at 02:56

## 2023-03-05 NOTE — ED PROVIDER NOTE - PATIENT PORTAL LINK FT
You can access the FollowMyHealth Patient Portal offered by [facility] by registering at the following website: [email or web address]. By joining [facility]’s FollowMyHealth portal, you will also be able to view your health information using other applications (apps) compatible with our system.

## 2023-03-05 NOTE — ED PROVIDER NOTE - CARE PROVIDER_API CALL
Joshua Rees (MD)  Surgery  [address], [address]st [place] [address]  Phone: [phone]  Fax: [phone]  Follow Up Time:

## 2023-03-05 NOTE — ED PROVIDER NOTE - CLINICAL SUMMARY MEDICAL DECISION MAKING FREE TEXT BOX
Abd soft, pt well appearing and states she feels better and is able to tolerate PO. No ttp in the RUQ, normal LFTs, gall stones but no sign on exam or imaging of roseanna, stable for dc

## 2023-03-05 NOTE — ED PROVIDER NOTE - IV ALTEPLASE EXCL ABS HIDDEN
[address] [facility] DERMATOLOGY  [address]  [place] [address]  [phone]  [phone]     MRN: [id number] : 1962  Encounter: [id number]  Patient Information: Erkia Bowman  Chief complaint:  For his psoriasis and probable contact reaction    History of present illness:  60year old male presents for overall skin check history of psoriasis on Tremfya doing well no problems noted however developed a 2nd rash that occurred about for last 2 weeks patient notes that is slightly itchy with some new spots that have developed  History reviewed  No pertinent past medical history  History reviewed  No pertinent surgical history  Social History   Social History     Substance and Sexual Activity   Alcohol Use None     Social History     Substance and Sexual Activity   Drug Use Not on file     Social History     Tobacco Use   Smoking Status Former Smoker   Smokeless Tobacco Never Used     History reviewed  No pertinent family history  Meds/Allergies   No Known Allergies    Meds:  Prior to Admission medications    Medication Sig Start Date End Date Taking? Authorizing Provider   amLODIPine (NORVASC) 10 mg tablet Take 10 mg by mouth 17  Yes Historical Provider, MD   atorvastatin (LIPITOR) 20 mg tablet  20  Yes Historical Provider, MD   clobetasol (TEMOVATE) 0 05 % external solution Apply topically 2 (two) times a day   Yes Historical Provider, MD   clobetasol (TEMOVATE) 0 05 % ointment Apply topically 2 (two) times a day To rash on right wrist till resolved also for the back 21  Yes Teresa Jean MD   clobetasol propionate (CLOBEX) 0 05 % external spray Apply topically   Yes Historical Provider, MD   Guselkumab (Tremfya) 100 MG/ML SOSY INJECT 1 SYRINGE UNDER THE SKIN EVERY 8 WEEKS   21  Yes Teresa Jean MD   losartan-hydrochlorothiazide [facility]) 100-25 MG per tablet TK 1 T PO QD 20  Yes Historical Provider, MD   Multiple Vitamin (MULTI-VITAMIN DAILY) TABS Take by mouth   Yes Historical Provider, MD   tadalafil (CIALIS) 5 MG tablet Take 5 mg by mouth 5/11/17  Yes Historical Provider, MD   valsartan-hydrochlorothiazide (DIOVAN-HCT) 320-12 5 MG per tablet Take by mouth 4/13/14  Yes Historical Provider, MD   dexlansoprazole (DEXILANT) 60 MG capsule Take by mouth  Patient not taking: No sig reported 6/8/15   Historical Provider, MD   doxepin (SINEquan) 10 mg/mL solution by Does not apply route  Patient not taking: No sig reported 2/4/15   Historical Provider, MD   esomeprazole (NexIUM) 20 mg capsule Take 40 mg by mouth  Patient not taking: No sig reported 5/11/17   Historical Provider, MD   fluticasone (CUTIVATE) 0 05 % cream Apply topically 2 (two) times a day To rash till resolved  Patient not taking: No sig reported 1/7/21   Hussein Escudero MD       Subjective:     Review of Systems:    General: negative for - chills, fatigue, fever,  weight gain or weight loss  Psychological: negative for - anxiety, behavioral disorder, concentration difficulties, decreased libido, depression, irritability, memory difficulties, mood swings, sleep disturbances or suicidal ideation  ENT: negative for - hearing difficulties , nasal congestion, nasal discharge, oral lesions, sinus pain, sneezing, sore throat  Allergy and Immunology: negative for - hives, insect bite sensitivity,  Hematological and Lymphatic: negative for - bleeding problems, blood clots,bruising, swollen lymph nodes  Endocrine: negative for - hair pattern changes, hot flashes, malaise/lethargy, mood swings, palpitations, polydipsia/polyuria, skin changes, temperature intolerance or unexpected weight change  Respiratory: negative for - cough, hemoptysis, orthopnea, shortness of breath, or wheezing  Cardiovascular: negative for - chest pain, dyspnea on exertion, edema,  Gastrointestinal: negative for - abdominal pain, nausea/vomiting  Genito-Urinary: negative for - dysuria, incontinence, irregular/heavy menses or urinary frequency/urgency  Musculoskeletal: negative for - gait disturbance, joint pain, joint stiffness, joint swelling, muscle pain, muscular weakness  Dermatological:  As in HPI  Neurological: negative for confusion, dizziness, headaches, impaired coordination/balance, memory loss, numbness/tingling, seizures, speech problems, tremors or weakness       Objective:   Ht 5' 7" (1 702 m)   Wt 93 kg (205 lb)   BMI 32 11 kg/m²     Physical Exam:    General Appearance:    Alert, cooperative, no distress   Head:    Normocephalic, without obvious abnormality, atraumatic           Skin:   A full skin exam was performed including scalp, head scalp, eyes, ears, nose, lips, neck, chest, axilla, abdomen, back, buttocks, bilateral upper extremities, bilateral lower extremities, hands, feet, fingers, toes, fingernails, and toenails no active psoriasis noted scaling erythematous well-demarcated patches noted on the right abdomen on both arms nothing else appearing on complete exam     Assessment:     1  Psoriasis     2  Poison ivy dermatitis     3  Screening for skin condition           Plan:   Psoriasis under good control continue Tremfya patient will return 2 months at which point we will repeat his blood work  Poison ivy dermatitis we discussed the concept of this process he a patient knows where his exposure was at work will go ahead treat with topical steroid  Nothing else of concern noted follow-up in 4 months    Arline Quinn MD  1/51/9786,6:76 PM    Portions of the record may have been created with voice recognition software   Occasional wrong word or "sound a like" substitutions may have occurred due to the inherent limitations of voice recognition software   Read the chart carefully and recognize, using context, where substitutions have occurred  show

## 2023-03-05 NOTE — ED PROVIDER NOTE - OBJECTIVE STATEMENT
27 year old female with no PMH presents with abd pain. Pt reports that her pain strated this am as an epigastric cramping abd pain. nausea but no vomitign, diarrhea, melena, dysuria, hematuria, chest pain, fevers, chills.

## 2023-03-05 NOTE — ED PROVIDER NOTE - NSDCPRINTRESULTS_ED_ALL_ED
Patient requests all Lab, Cardiology, and Radiology Results on their Discharge Instructions Nepalese

## 2023-04-11 ENCOUNTER — OUTPATIENT (OUTPATIENT)
Dept: OUTPATIENT SERVICES | Facility: HOSPITAL | Age: 28
LOS: 1 days | End: 2023-04-11

## 2023-04-11 ENCOUNTER — APPOINTMENT (OUTPATIENT)
Dept: SURGERY | Facility: HOSPITAL | Age: 28
End: 2023-04-11

## 2023-04-11 VITALS
SYSTOLIC BLOOD PRESSURE: 96 MMHG | BODY MASS INDEX: 22.28 KG/M2 | WEIGHT: 118 LBS | TEMPERATURE: 98 F | HEART RATE: 56 BPM | HEIGHT: 61 IN | DIASTOLIC BLOOD PRESSURE: 62 MMHG

## 2023-04-11 NOTE — HISTORY OF PRESENT ILLNESS
[de-identified] : 28 yo F referral for cholelithiasis from her OB, pt noted to have cholelithiasis on US. Pt reports 1 month of RUQ abdominal pain, initially associated with vomiting and diarrhea which is now resolved. Pt has no medical hx or surgical hx.

## 2023-04-11 NOTE — REVIEW OF SYSTEMS
[Fever] : no fever [Chills] : no chills [As Noted in HPI] : as noted in HPI [Abdominal Pain] : abdominal pain [Vomiting] : no vomiting [Diarrhea] : no diarrhea [Negative] : Respiratory

## 2023-04-11 NOTE — ASSESSMENT
[FreeTextEntry1] : 26 yo F w/Hx of cholelithiasis on US w/associated biliary colic and concern for acute cholecystitis

## 2023-04-11 NOTE — PHYSICAL EXAM
[JVD] : no jugular venous distention  [Normal Breath Sounds] : Normal breath sounds [Normal Heart Sounds] : normal heart sounds [Abdominal Masses] : No abdominal masses [Abdomen Tenderness] : ~T ~M Abdominal tenderness [de-identified] : NAD [de-identified] : NCAT  [de-identified] : +tenderness to deep palpitation, negative keara

## 2023-04-12 DIAGNOSIS — K80.66 CALCULUS OF GALLBLADDER AND BILE DUCT WITH ACUTE AND CHRONIC CHOLECYSTITIS WITHOUT OBSTRUCTION: ICD-10-CM

## 2023-05-10 ENCOUNTER — OUTPATIENT (OUTPATIENT)
Dept: OUTPATIENT SERVICES | Facility: HOSPITAL | Age: 28
LOS: 1 days | End: 2023-05-10
Payer: COMMERCIAL

## 2023-05-10 VITALS
RESPIRATION RATE: 16 BRPM | HEIGHT: 61.02 IN | WEIGHT: 121.25 LBS | TEMPERATURE: 98 F | OXYGEN SATURATION: 99 % | DIASTOLIC BLOOD PRESSURE: 71 MMHG | SYSTOLIC BLOOD PRESSURE: 105 MMHG | HEART RATE: 63 BPM

## 2023-05-10 DIAGNOSIS — R07.9 CHEST PAIN, UNSPECIFIED: ICD-10-CM

## 2023-05-10 DIAGNOSIS — K80.66 CALCULUS OF GALLBLADDER AND BILE DUCT WITH ACUTE AND CHRONIC CHOLECYSTITIS WITHOUT OBSTRUCTION: ICD-10-CM

## 2023-05-10 DIAGNOSIS — Z78.9 OTHER SPECIFIED HEALTH STATUS: ICD-10-CM

## 2023-05-10 DIAGNOSIS — K80.12 CALCULUS OF GALLBLADDER WITH ACUTE AND CHRONIC CHOLECYSTITIS WITHOUT OBSTRUCTION: ICD-10-CM

## 2023-05-10 LAB
ALBUMIN SERPL ELPH-MCNC: 4 G/DL — SIGNIFICANT CHANGE UP (ref 3.3–5)
ALP SERPL-CCNC: 71 U/L — SIGNIFICANT CHANGE UP (ref 40–120)
ALT FLD-CCNC: 20 U/L — SIGNIFICANT CHANGE UP (ref 4–33)
ANION GAP SERPL CALC-SCNC: 11 MMOL/L — SIGNIFICANT CHANGE UP (ref 7–14)
AST SERPL-CCNC: 26 U/L — SIGNIFICANT CHANGE UP (ref 4–32)
BILIRUB SERPL-MCNC: 0.2 MG/DL — SIGNIFICANT CHANGE UP (ref 0.2–1.2)
BUN SERPL-MCNC: 10 MG/DL — SIGNIFICANT CHANGE UP (ref 7–23)
CALCIUM SERPL-MCNC: 9.2 MG/DL — SIGNIFICANT CHANGE UP (ref 8.4–10.5)
CHLORIDE SERPL-SCNC: 102 MMOL/L — SIGNIFICANT CHANGE UP (ref 98–107)
CO2 SERPL-SCNC: 27 MMOL/L — SIGNIFICANT CHANGE UP (ref 22–31)
CREAT SERPL-MCNC: 0.59 MG/DL — SIGNIFICANT CHANGE UP (ref 0.5–1.3)
EGFR: 126 ML/MIN/1.73M2 — SIGNIFICANT CHANGE UP
GLUCOSE SERPL-MCNC: 77 MG/DL — SIGNIFICANT CHANGE UP (ref 70–99)
HCG SERPL-ACNC: <1 MIU/ML — SIGNIFICANT CHANGE UP
HCT VFR BLD CALC: 36.6 % — SIGNIFICANT CHANGE UP (ref 34.5–45)
HGB BLD-MCNC: 11.5 G/DL — SIGNIFICANT CHANGE UP (ref 11.5–15.5)
MCHC RBC-ENTMCNC: 25.8 PG — LOW (ref 27–34)
MCHC RBC-ENTMCNC: 31.4 GM/DL — LOW (ref 32–36)
MCV RBC AUTO: 82.2 FL — SIGNIFICANT CHANGE UP (ref 80–100)
NRBC # BLD: 0 /100 WBCS — SIGNIFICANT CHANGE UP (ref 0–0)
NRBC # FLD: 0 K/UL — SIGNIFICANT CHANGE UP (ref 0–0)
PLATELET # BLD AUTO: 241 K/UL — SIGNIFICANT CHANGE UP (ref 150–400)
POTASSIUM SERPL-MCNC: 3.5 MMOL/L — SIGNIFICANT CHANGE UP (ref 3.5–5.3)
POTASSIUM SERPL-SCNC: 3.5 MMOL/L — SIGNIFICANT CHANGE UP (ref 3.5–5.3)
PROT SERPL-MCNC: 7.5 G/DL — SIGNIFICANT CHANGE UP (ref 6–8.3)
RBC # BLD: 4.45 M/UL — SIGNIFICANT CHANGE UP (ref 3.8–5.2)
RBC # FLD: 14 % — SIGNIFICANT CHANGE UP (ref 10.3–14.5)
SODIUM SERPL-SCNC: 140 MMOL/L — SIGNIFICANT CHANGE UP (ref 135–145)
WBC # BLD: 7.73 K/UL — SIGNIFICANT CHANGE UP (ref 3.8–10.5)
WBC # FLD AUTO: 7.73 K/UL — SIGNIFICANT CHANGE UP (ref 3.8–10.5)

## 2023-05-10 PROCEDURE — 93010 ELECTROCARDIOGRAM REPORT: CPT

## 2023-05-10 RX ORDER — SODIUM CHLORIDE 9 MG/ML
1000 INJECTION, SOLUTION INTRAVENOUS
Refills: 0 | Status: DISCONTINUED | OUTPATIENT
Start: 2023-05-19 | End: 2023-06-02

## 2023-05-10 NOTE — H&P PST ADULT - HISTORY OF PRESENT ILLNESS
28 y/o F   C/O RUQ - epigastric pain x 2 months , worsening by fatty, greasy foods. Pt was evaluated at [facility] ED, S/P US of abd. showed gallstones.  26 y/o F presents to PST for pre ope valuation with C/O RUQ - epigastric pain x 2 months , worsening by fatty, greasy foods. Pt was evaluated at [facility] ED, S/P US of abd. which showed gallstones. Schedule for laparoscopic cholecystectomy possible open tentatively on n05/19/23 28 y/o F Hong [place]er speaking presents to PST for pre op evaluation with C/O RUQ - epigastric pain x 2 months , worsening by fatty, greasy foods. Pt was evaluated at [facility] ED, S/P US of abd. which showed gallstones. Schedule for laparoscopic cholecystectomy possible open tentatively on 05/19/23

## 2023-05-10 NOTE — H&P PST ADULT - NEGATIVE ENMT SYMPTOMS
no ear pain/no tinnitus/no vertigo/no post-nasal discharge/no nose bleeds/no dry mouth/no throat pain/no dysphagia

## 2023-05-10 NOTE — H&P PST ADULT - NEGATIVE GASTROINTESTINAL SYMPTOMS
no nausea/no vomiting/no diarrhea/no constipation/no melena/no jaundice no nausea/no vomiting/no diarrhea/no constipation/no change in bowel habits/no melena/no jaundice

## 2023-05-10 NOTE — H&P PST ADULT - NSANTHOSAYNRD_GEN_A_CORE
No. MISAEL screening performed.  STOP BANG Legend: 0-2 = LOW Risk; 3-4 = INTERMEDIATE Risk; 5-8 = HIGH Risk

## 2023-05-10 NOTE — H&P PST ADULT - PROBLEM SELECTOR PLAN 1
Schedule for laparoscopic cholecystectomy possible open tentatively on 05/19/2023. Pre op instructions, famotidine, chlorhexidine gluconate soap given and explained. Pt instructed to bring urine specimen on day of surgery, urine cup given to pr who verbalized understanding.

## 2023-05-10 NOTE — H&P PST ADULT - NSICDXPASTMEDICALHX_GEN_ALL_CORE_FT
PAST MEDICAL HISTORY:   (normal spontaneous vaginal delivery) 2017     PAST MEDICAL HISTORY:  Calculus of GB and bile duct w ac and chr cholecyst w/o obst      (normal spontaneous vaginal delivery) 2017

## 2023-05-10 NOTE — H&P PST ADULT - CARDIOVASCULAR SYMPTOMS
intermittent x 3 years/chest pain intermittently, mostly with activity, relieve on its own x 3 years,/chest pain

## 2023-05-10 NOTE — H&P PST ADULT - PROBLEM SELECTOR PLAN 2
Pt Pt reported h/o intermittent chest pain. Pt was instructed to see PCP for evaluation pre op.    Pending medical/cardiology consultation

## 2023-05-10 NOTE — H&P PST ADULT - EYES

## 2023-05-18 ENCOUNTER — TRANSCRIPTION ENCOUNTER (OUTPATIENT)
Age: 28
End: 2023-05-18

## 2023-05-18 NOTE — ASU PATIENT PROFILE, ADULT - NSICDXPASTMEDICALHX_GEN_ALL_CORE_FT
PAST MEDICAL HISTORY:  Calculus of GB and bile duct w ac and chr cholecyst w/o obst      (normal spontaneous vaginal delivery) 2017

## 2023-05-18 NOTE — ASU PATIENT PROFILE, ADULT - FALL HARM RISK - UNIVERSAL INTERVENTIONS
Bed in lowest position, wheels locked, appropriate side rails in place/Call bell, personal items and telephone in reach/Instruct patient to call for assistance before getting out of bed or chair/Non-slip footwear when patient is out of bed/[place] to call system/Physically safe environment - no spills, clutter or unnecessary equipment/Purposeful Proactive Rounding/Room/bathroom lighting operational, light cord in reach

## 2023-05-19 ENCOUNTER — OUTPATIENT (OUTPATIENT)
Dept: OUTPATIENT SERVICES | Facility: HOSPITAL | Age: 28
LOS: 1 days | Discharge: HOME | End: 2023-05-19
Payer: COMMERCIAL

## 2023-05-19 ENCOUNTER — APPOINTMENT (OUTPATIENT)
Dept: TRAUMA SURGERY | Facility: HOSPITAL | Age: 28
End: 2023-05-19

## 2023-05-19 ENCOUNTER — TRANSCRIPTION ENCOUNTER (OUTPATIENT)
Age: 28
End: 2023-05-19

## 2023-05-19 ENCOUNTER — RESULT REVIEW (OUTPATIENT)
Age: 28
End: 2023-05-19

## 2023-05-19 VITALS
HEART RATE: 69 BPM | RESPIRATION RATE: 16 BRPM | OXYGEN SATURATION: 100 % | SYSTOLIC BLOOD PRESSURE: 101 MMHG | DIASTOLIC BLOOD PRESSURE: 68 MMHG

## 2023-05-19 VITALS — OXYGEN SATURATION: 99 % | HEIGHT: 61 IN | WEIGHT: 123.46 LBS

## 2023-05-19 DIAGNOSIS — K80.66 CALCULUS OF GALLBLADDER AND BILE DUCT WITH ACUTE AND CHRONIC CHOLECYSTITIS WITHOUT OBSTRUCTION: ICD-10-CM

## 2023-05-19 LAB — HCG UR QL: NEGATIVE — SIGNIFICANT CHANGE UP

## 2023-05-19 PROCEDURE — 88304 TISSUE EXAM BY PATHOLOGIST: CPT | Mod: 26

## 2023-05-19 PROCEDURE — 47562 LAPAROSCOPIC CHOLECYSTECTOMY: CPT | Mod: GC

## 2023-05-19 DEVICE — CLIP LIGATING VESOLOCK MED/LG GRN: Type: IMPLANTABLE DEVICE | Status: FUNCTIONAL

## 2023-05-19 RX ORDER — FENTANYL CITRATE 50 UG/ML
25 INJECTION INTRAVENOUS ONCE
Refills: 0 | Status: DISCONTINUED | OUTPATIENT
Start: 2023-05-19 | End: 2023-05-19

## 2023-05-19 RX ORDER — FENTANYL CITRATE 50 UG/ML
50 INJECTION INTRAVENOUS
Refills: 0 | Status: DISCONTINUED | OUTPATIENT
Start: 2023-05-19 | End: 2023-05-19

## 2023-05-19 RX ORDER — ONDANSETRON 8 MG/1
4 TABLET, FILM COATED ORAL ONCE
Refills: 0 | Status: COMPLETED | OUTPATIENT
Start: 2023-05-19 | End: 2023-05-19

## 2023-05-19 RX ORDER — ACETAMINOPHEN 500 MG
2 TABLET ORAL
Qty: 40 | Refills: 0
Start: 2023-05-19 | End: 2023-05-23

## 2023-05-19 RX ORDER — HYDROMORPHONE HYDROCHLORIDE 2 MG/ML
1 INJECTION INTRAMUSCULAR; INTRAVENOUS; SUBCUTANEOUS
Refills: 0 | Status: DISCONTINUED | OUTPATIENT
Start: 2023-05-19 | End: 2023-05-19

## 2023-05-19 RX ORDER — HYDROMORPHONE HYDROCHLORIDE 2 MG/ML
0.5 INJECTION INTRAMUSCULAR; INTRAVENOUS; SUBCUTANEOUS
Refills: 0 | Status: DISCONTINUED | OUTPATIENT
Start: 2023-05-19 | End: 2023-05-19

## 2023-05-19 RX ORDER — HYDROMORPHONE HYDROCHLORIDE 2 MG/ML
0.25 INJECTION INTRAMUSCULAR; INTRAVENOUS; SUBCUTANEOUS ONCE
Refills: 0 | Status: DISCONTINUED | OUTPATIENT
Start: 2023-05-19 | End: 2023-05-19

## 2023-05-19 RX ORDER — OXYCODONE HYDROCHLORIDE 5 MG/1
5 TABLET ORAL ONCE
Refills: 0 | Status: DISCONTINUED | OUTPATIENT
Start: 2023-05-19 | End: 2023-05-19

## 2023-05-19 RX ORDER — SODIUM CHLORIDE 9 MG/ML
1000 INJECTION, SOLUTION INTRAVENOUS
Refills: 0 | Status: DISCONTINUED | OUTPATIENT
Start: 2023-05-19 | End: 2023-06-02

## 2023-05-19 RX ORDER — OXYCODONE HYDROCHLORIDE 5 MG/1
1 TABLET ORAL
Qty: 7 | Refills: 0
Start: 2023-05-19

## 2023-05-19 RX ORDER — OXYCODONE HYDROCHLORIDE 5 MG/1
1 TABLET ORAL
Qty: 8 | Refills: 0
Start: 2023-05-19

## 2023-05-19 RX ORDER — IBUPROFEN 200 MG
1 TABLET ORAL
Qty: 20 | Refills: 0
Start: 2023-05-19 | End: 2023-05-23

## 2023-05-19 RX ORDER — TRAMADOL HYDROCHLORIDE 50 MG/1
25 TABLET ORAL ONCE
Refills: 0 | Status: DISCONTINUED | OUTPATIENT
Start: 2023-05-19 | End: 2023-05-19

## 2023-05-19 RX ADMIN — HYDROMORPHONE HYDROCHLORIDE 0.25 MILLIGRAM(S): 2 INJECTION INTRAMUSCULAR; INTRAVENOUS; SUBCUTANEOUS at 15:09

## 2023-05-19 RX ADMIN — OXYCODONE HYDROCHLORIDE 5 MILLIGRAM(S): 5 TABLET ORAL at 16:00

## 2023-05-19 RX ADMIN — FENTANYL CITRATE 50 MICROGRAM(S): 50 INJECTION INTRAVENOUS at 13:56

## 2023-05-19 RX ADMIN — TRAMADOL HYDROCHLORIDE 25 MILLIGRAM(S): 50 TABLET ORAL at 16:53

## 2023-05-19 RX ADMIN — OXYCODONE HYDROCHLORIDE 5 MILLIGRAM(S): 5 TABLET ORAL at 15:16

## 2023-05-19 RX ADMIN — ONDANSETRON 4 MILLIGRAM(S): 8 TABLET, FILM COATED ORAL at 16:46

## 2023-05-19 RX ADMIN — FENTANYL CITRATE 50 MICROGRAM(S): 50 INJECTION INTRAVENOUS at 14:11

## 2023-05-19 RX ADMIN — SODIUM CHLORIDE 30 MILLILITER(S): 9 INJECTION, SOLUTION INTRAVENOUS at 14:14

## 2023-05-19 RX ADMIN — HYDROMORPHONE HYDROCHLORIDE 0.25 MILLIGRAM(S): 2 INJECTION INTRAMUSCULAR; INTRAVENOUS; SUBCUTANEOUS at 15:24

## 2023-05-19 NOTE — BRIEF OPERATIVE NOTE - OPERATION/FINDINGS
High-flow gas insufflation followed by insertion of trocars. The gallbladder was pushed up allowing for clear dissection. The critical angle was identified. Cystic duct was clamped with 5 mm hemostat and the cystic artery was clamped with 5mm hemostat. A dissection with a shear was done. A second look for any bleeding was done. The gallbladder was extracted. Fascia was closed. Skin was closed with 3.0 monocryl.

## 2023-05-19 NOTE — ASU DISCHARGE PLAN (ADULT/PEDIATRIC) - NS MD DC FALL RISK RISK
For information on Fall & Injury Prevention, visit: [email or web address] OR  [email or web address] OR  https://www.cdc.gov/steadi/patient.html

## 2023-05-19 NOTE — ASU DISCHARGE PLAN (ADULT/PEDIATRIC) - CARE PROVIDER_API CALL
Mona Aguayo)  Critical Care Medicine; Surgery  [address] [place] [address]  Phone: [phone]  Fax: [phone]  Established Patient  Follow Up Time: 2 weeks

## 2023-05-19 NOTE — ASU DISCHARGE PLAN (ADULT/PEDIATRIC) - CALL YOUR DOCTOR IF YOU HAVE ANY OF THE FOLLOWING:
100.4/Bleeding that does not stop/Swelling that gets worse/Pain not relieved by Medications/Fever greater than (need to indicate Fahrenheit or Celsius)/Wound/Surgical Site with redness, or foul smelling discharge or pus/Numbness, tingling, color or temperature change to extremity/Nausea and vomiting that does not stop/Excessive diarrhea/Inability to tolerate liquids or foods/Increased irritability or sluggishness

## 2023-06-07 LAB — SURGICAL PATHOLOGY STUDY: SIGNIFICANT CHANGE UP

## 2023-06-13 ENCOUNTER — APPOINTMENT (OUTPATIENT)
Dept: SURGERY | Facility: HOSPITAL | Age: 28
End: 2023-06-13

## 2023-06-13 ENCOUNTER — OUTPATIENT (OUTPATIENT)
Dept: OUTPATIENT SERVICES | Facility: HOSPITAL | Age: 28
LOS: 1 days | End: 2023-06-13

## 2023-06-13 VITALS
HEART RATE: 66 BPM | BODY MASS INDEX: 23.03 KG/M2 | TEMPERATURE: 98.1 F | WEIGHT: 122 LBS | SYSTOLIC BLOOD PRESSURE: 105 MMHG | DIASTOLIC BLOOD PRESSURE: 60 MMHG | HEIGHT: 61 IN

## 2023-06-13 PROBLEM — K80.66: Chronic | Status: ACTIVE | Noted: 2023-05-10

## 2023-06-13 NOTE — PHYSICAL EXAM
[Normal Rate and Rhythm] : normal rate and rhythm [Abdominal Masses] : No abdominal masses [Abdomen Tenderness] : ~T ~M No abdominal tenderness [Tender] : nontender [Enlarged] : not enlarged [de-identified] : NAD [de-identified] : Soft, non distended, non tender. Incisions healing well.

## 2023-06-13 NOTE — ASSESSMENT
[FreeTextEntry1] : 26 y/o female coming to clinic for follow up 4 weeks after laparoscopic cholecystectomy for biliary cholic, incisions healing properly. Pathology shows chronic inflammation.\par \par PLAN:\par - Recommended to keep avoiding heavy lifting for 4 more weeks. \par - Discharged from clinic, asked to come back if issues.

## 2023-06-13 NOTE — HISTORY OF PRESENT ILLNESS
[de-identified] : 28 y/o female coming to clinic for follow up 4 weeks after laparoscopic cholecystectomy for biliary cholic, patient denies pain, nausea or vomiting, no SOB no diarrhea. Has no complains. [de-identified] : 28 y/o female coming to clinic for follow up 4 weeks after laparoscopic cholecystectomy for biliary cholic; patient denies pain, nausea or vomiting, no SOB no diarrhea. Has no complains.

## 2023-06-14 DIAGNOSIS — K81.9 CHOLECYSTITIS, UNSPECIFIED: ICD-10-CM

## 2023-06-23 NOTE — OB PROVIDER H&P - LABOR: CERVICAL CONSISTENCY
[address] [facility] [address] [address] Program  Individualized Cardiac Treatment Plan    Patient Name:  Ronald Kingston  :  1971  Age:  45 y.o. MRN: [id number]    Diagnosis: PCI  Date of Event: 23   Physician:  Patrice Rodgers  Next Office Visit:  23  Date Entered Program: 23  Risk Stratifications: [] Low [x] Intermediate [] High  Allergies: No Known Allergies    Individual Cardiac Treatment Plan -EXERCISE  INITIAL 30 DAY 60 DAY 90 DAY FINAL DAY FINAL DAY   EXERCISE  ASSESSMENT/PLAN EXERCISE  REASSESSMENT EXERCISE   REASSESSMENT EXERCISE   REASSESSMENT EXERCISE   REASSESSMENT EXERCISE  DISCHARGE/FOLLOW-UP   Date: 23 Date: Date: Date: Date: Date:   Session #1   Total Session #     First Exercise Session:   Total Session #  Total Session #  Total Session #  Total Session #    Last Exercises Session: **   Stages of Change Stages of Change Stages of Change Stages of Change Stages of Change Stages of Change   [] Pre Contemplation  [] Contemplation  [] Preparation  [] Action  [] Maintenance  [] Relapse [] Pre Contemplation  [] Contemplation  [] Preparation  [] Action  [] Maintenance  [] Relapse [] Pre Contemplation  [] Contemplation  [] Preparation  [] Action  [] Maintenance  [] Relapse [] Pre Contemplation  [] Contemplation  [] Preparation  [] Action  [] Maintenance  [] Relapse [] Pre Contemplation  [] Contemplation  [] Preparation  [] Action  [] Maintenance  [] Relapse [] Pre Contemplation  [] Contemplation  [] Preparation  [] Action  [] Maintenance  [] Relapse   EXERCISE ASSESSMENT EXERCISE ASSESSMENT EXERCISE ASSESSMENT EXERCISE ASSESSMENT EXERCISE ASSESSMENT EXERCISE ASSESSMENT   6 Min Walk Test  Distance walked: 0.14 miles  739 ft.  2.07 METs  Max HR:86 BPM      RPE:  13    THR:  111-134  Rhythm:  SGuillermina Blakely     6 Min Walk Test  Distance walked:  Miles   ft   METs  Max HR:  BPM      RPE:      %Change ft.  =    Rhythm:     DASI: 8.3 METs DASI: ** METs soft

## 2023-10-12 ENCOUNTER — APPOINTMENT (OUTPATIENT)
Dept: OBGYN | Facility: CLINIC | Age: 28
End: 2023-10-12

## 2023-11-28 NOTE — ASU PREOP CHECKLIST - CHLOROHEXIDINE WASH 1
Daily Note     Today's date: 2023  Patient name: Mary Escudero  : 2020  MRN: [id number]  Referring provider: Carlos Ballard PA-C  Dx:   Encounter Diagnosis     ICD-10-CM    1. In-toeing, right  M20.5X1       2. In-toeing, left  M20.5X2       3. Torticollis  M43.6           Start Time:   Stop Time:   Total time in clinic (min): 40 minutes    Subjective:  Severa Ripper arrived with her father to physical therapy today, with father remaining in the car during the session. There are no new medical updates. Severa Ripper was working on jumping off of the couch and landing on her feet, and was able to do so successfully. Objective:  - Manual stretching into right cervical rotation and left lateral flexion while seated on [employer] bench  - Active right cervical rotation to view target with left shoulder block  - Jumping down from 14" height (trials with and without landing target)  - Forward hopping x 12 feet on each leg, with 1 hand held and independent trials  - Vision game while seated edge of Plasticell bench, placement of wedge under left ischium to reduce right lateral neck flexion  - Left half kneel for small playground ball catch and throw with therapist from 3-foot distance    Assessment:  Severa Ripper had a slight improvement in passive cervical range of motion into right rotation today compared to her last session. The lateral band of her SCM muscle had decreased tissue extensibility compared to the medial band, contributing to greater restrictions into left lateral flexion today compared to rotation. Although she is not yet reaching full active or passive cervical range of motion, she displayed a mild right lateral head tilt only during vision games that were in close proximity to her face (~1 foot). Severa Ripper is experiencing difficulty jumping into the pool, although she can complete this skill and from a higher surface in her home.   Severa Ripper was able to land jumps from the 14-inch height today on her feet, although therapist noted that her visual gaze was straight forwards instead of down towards the ground. With cues she was able to correct her visual focus, although she continues to prefer a forward gaze instead. Reliance of her joints landing on a solid surface and proprioception, in comparison to visual skills, may be limiting her ability to land jumps into the pool as it is more difficult to proactively  her landing position in the depths of the water. Joselyn Jameson continues to struggle with forward single leg hoping and reached maximally 2 consecutive hops on each leg, over a total of about 1-2 feet. There were no significant changes in performance of this skill compared to last week. Joselyn Jameson continues to stand from the floor through her right half kneel leading on 100% of undirected trials, thus her torticollis and preference for right-sided shortening was addressed with ball catching (led to 50% success) in a non-preferred left half kneel. Brief and bilateral mild intoeing was noted in today's session and resolved with self-correction. Plan:  Continue per plan of care. Joselyn Jameson will benefit from skilled physical therapy services 1 day per week for the next 6 months, to address her intoeing and unresolved torticollis, and allow her to meet all age-appropriate gross motor skills.      Home Exercise Program  - Neck stretching and strengthening to turn right and tilt left  - SL forwards hopping 19-May-2023 07:00

## 2024-01-08 ENCOUNTER — ASOB RESULT (OUTPATIENT)
Age: 29
End: 2024-01-08

## 2024-01-08 ENCOUNTER — APPOINTMENT (OUTPATIENT)
Dept: ANTEPARTUM | Facility: CLINIC | Age: 29
End: 2024-01-08
Payer: MEDICAID

## 2024-01-08 PROCEDURE — 76801 OB US < 14 WKS SINGLE FETUS: CPT

## 2024-03-04 ENCOUNTER — APPOINTMENT (OUTPATIENT)
Dept: ANTEPARTUM | Facility: CLINIC | Age: 29
End: 2024-03-04
Payer: MEDICAID

## 2024-03-04 ENCOUNTER — ASOB RESULT (OUTPATIENT)
Age: 29
End: 2024-03-04

## 2024-03-04 PROCEDURE — 76817 TRANSVAGINAL US OBSTETRIC: CPT

## 2024-03-04 PROCEDURE — 76811 OB US DETAILED SNGL FETUS: CPT

## 2024-05-10 NOTE — OB RN PATIENT PROFILE - LANGUAGE ASSISTANCE NEEDED
Yes-Patient/Caregiver accepts free interpretation services...
This CCLS engaged pt. in a therapeutic activity to support coping and adjustment. Emotional support was provided to pt. and family. Parent/caregiver support and preparation were provided.

## 2024-05-20 NOTE — H&P PST ADULT - TOBACCO USE
Subjective   Chief Complaint   Patient presents with    Establish Care     Menstrual cramps / has some back pain and bloating the week before her cycle; her cramps have been severe during her cycles; cycles are very heavy     Lorenza Mccloud is a 24 y.o. year old  presenting to be seen for her annual exam and she has some concerns regarding her menstrual cycles.     SEXUAL Hx:  She is currently sexually active.  In the past year there there has been NO new sexual partners.    Condoms are never used.  She would not like to be screened for STD's at today's exam.  Current birth control method: not using any form of contraception.  She is not trying to conceive but would be OK if she did get pregnant.  She is happy with her current method of contraception and does not want to discuss alternative methods of contraception.  She has taken depo provera and COCP and CHC patch in the past. She reports her menstrual cycles were okay then. She last used these around 2 years ago.   MENSTRUAL Hx:  Patient's last menstrual period was 05/10/2024 (exact date).  In the past 6 months her cycles have been regular, predictable and occur monthly.  Her menstrual flow is typically moderately heavy.   Each month on average there are roughly 2 day(s) of very heavy flow.    Duration ~ 5 days.   Intermenstrual bleeding is absent.    Post-coital bleeding is  sometimes present with some painful sex .  Dysmenorrhea: moderate and affecting her activities of daily living  PMS: Yes - bloating week before   Her cycles ARE a source of concern for her that she wishes to discuss today.  HEALTH Hx:  She exercises regularly: yes.  She wears her seat belt: yes.  She has concerns about domestic violence: no.  OTHER THINGS SHE WANTS TO DISCUSS TODAY:  Nothing else    The following portions of the patient's history were reviewed and updated as appropriate:problem list, current medications, allergies, past family history, past medical history,  "past social history, and past surgical history.    Social History    Tobacco Use      Smoking status: Never        Passive exposure: Never      Smokeless tobacco: Never    Review of Systems  Constitutional POS: nothing reported    NEG: anorexia or night sweats   Genitourinary POS: nothing reported    NEG: dysuria or hematuria      Gastointestinal POS: nothing reported    NEG: bloating, change in bowel habits, melena, or reflux symptoms   Integument POS: nothing reported    NEG: moles that are changing in size, shape, color or rashes   Breast POS: nothing reported    NEG: persistent breast lump, skin dimpling, or nipple discharge        Objective   /70 (BP Location: Right arm, Patient Position: Sitting, Cuff Size: Adult)   Ht 157.5 cm (62\")   Wt 59 kg (130 lb)   LMP 05/10/2024 (Exact Date)   BMI 23.78 kg/m²     General:  well developed; well nourished  no acute distress   Skin:  No suspicious lesions seen   Thyroid: normal to inspection and palpation   Breasts:  Examined in supine position  Symmetric without masses or skin dimpling  Nipples normal without inversion, lesions or discharge  There are no palpable axillary nodes   Abdomen: soft, non-tender; no masses  no umbilical or inguinal hernias are present  no hepato-splenomegaly   Pelvis: Exam limited by  anxiety  Clinical staff was present for exam  External genitalia:  normal appearance of the external genitalia including Bartholin's and [place]'s glands.  :  urethral meatus normal;  Vaginal:  normal pink mucosa without prolapse or lesions.  Cervix:  normal appearance.  Uterus:  normal size, shape and consistency.  Adnexa:  normal bimanual exam of the adnexa.  Rectal:  digital rectal exam not performed; anus visually normal appearing.  Pelvic floor pain: her symptoms are not reproduced with palpation on the right pelvic sidewall; her symptoms are not reproduced with palpation on the left pelvic sidewall;        Assessment   Normal GYN " exam  AUB  Dysmenorrhea   Post coital bleeding     Plan   Pap was done today.  If she does not receive the results of the Pap within 2 weeks  time, she was instructed to call to find out the results.  I explained to Lorenza that the recommendations for Pap smear interval in a low risk patient's has lengthened to 3 years time.  I encouraged her to be seen yearly for a full physical exam including breast and pelvic exam even during the off years when PAP's will not be performed.  The following tests were ordered today: STD swabs for GC, chlamydia, and trichimoniasis.  It was explained to Lorenza that all lab test should be back within the one week after they are performed. She will be notified about the results, regardless of the findings. If she has not been contacted by the office within 2 weeks after the test has been performed, it is her responsibility to contact us to learn about her results.  Schedule pelvic ultrasound.  In addition we reviewed options to help with bleeding and painful menstrual cycles including hormonal contraception and endometriosis medication such as Orilissa or Myfembree.  She desires to start with the ultrasound first.  We also discussed the option of diagnostic laparoscopy.  No prescription was given or electronically sent at today's visit  The importance of keeping all planned follow-up and taking all medications as prescribed was emphasized.  Follow up for annual exam in ~ one year.  In addition return visit with pelvic ultrasound to discuss the above.    No orders of the defined types were placed in this encounter.         This note was electronically signed.    Anahi Vickers MD  May 20, 2024       Never smoker

## 2024-06-03 ENCOUNTER — ASOB RESULT (OUTPATIENT)
Age: 29
End: 2024-06-03

## 2024-06-03 ENCOUNTER — APPOINTMENT (OUTPATIENT)
Dept: ANTEPARTUM | Facility: CLINIC | Age: 29
End: 2024-06-03
Payer: MEDICAID

## 2024-06-03 PROCEDURE — 76816 OB US FOLLOW-UP PER FETUS: CPT

## 2024-06-03 PROCEDURE — 76819 FETAL BIOPHYS PROFIL W/O NST: CPT

## 2024-07-01 ENCOUNTER — ASOB RESULT (OUTPATIENT)
Age: 29
End: 2024-07-01

## 2024-07-01 ENCOUNTER — APPOINTMENT (OUTPATIENT)
Dept: ANTEPARTUM | Facility: CLINIC | Age: 29
End: 2024-07-01
Payer: MEDICAID

## 2024-07-01 PROCEDURE — 76816 OB US FOLLOW-UP PER FETUS: CPT

## 2024-07-15 ENCOUNTER — OUTPATIENT (OUTPATIENT)
Dept: OUTPATIENT SERVICES | Facility: HOSPITAL | Age: 29
LOS: 1 days | End: 2024-07-15
Payer: COMMERCIAL

## 2024-07-15 VITALS
SYSTOLIC BLOOD PRESSURE: 113 MMHG | TEMPERATURE: 99 F | HEART RATE: 96 BPM | DIASTOLIC BLOOD PRESSURE: 74 MMHG | RESPIRATION RATE: 18 BRPM

## 2024-07-15 VITALS
RESPIRATION RATE: 17 BRPM | TEMPERATURE: 98 F | DIASTOLIC BLOOD PRESSURE: 77 MMHG | HEART RATE: 93 BPM | SYSTOLIC BLOOD PRESSURE: 117 MMHG

## 2024-07-15 DIAGNOSIS — Z90.49 ACQUIRED ABSENCE OF OTHER SPECIFIED PARTS OF DIGESTIVE TRACT: Chronic | ICD-10-CM

## 2024-07-15 PROCEDURE — 59025 FETAL NON-STRESS TEST: CPT

## 2024-07-15 PROCEDURE — G0463: CPT

## 2024-07-15 NOTE — OB PROVIDER TRIAGE NOTE - NSHPPHYSICALEXAM_GEN_ALL_CORE
T(C): 37.3 (07-15-24 @ 08:37), Max: 37.3 (07-15-24 @ 08:33)  HR: 96 (07-15-24 @ 08:37) (96 - 96)  BP: 113/74 (07-15-24 @ 08:37) (113/74 - 113/74)  RR: 18 (07-15-24 @ 08:37) (18 - 18)  SpO2: --    Gen: NAD, well-appearing  Abd: soft, gravid  Ext: non-edematous, non-tender   SVE: 1.0, 50, -3  FHT: 135, moderate variability, + accels, - accels   [place]: abimael q5 min

## 2024-07-15 NOTE — OB RN TRIAGE NOTE - LANGUAGE ASSISTANCE NEEDED
triage done in Armenian by ALISSA Nieto RN/Yes-Patient/Caregiver accepts free interpretation services...

## 2024-07-15 NOTE — OB PROVIDER TRIAGE NOTE - HISTORY OF PRESENT ILLNESS
KT CROWE is a 28y  at 38 weeks, 5 days GA, LMP 23, who presents to L&D for acute intermittent episodes of lower abdominal pain. Patient says the abd pain began last night () at 10pm with a 5/10 intensity, aggravated by bending. She began having contractions at 3am this morning, occurring every 15 minutes, with associated 2/10 back pain. The abdominal pain occurs concurrently during some, but not all, of her contractions. She mentions that she has never had abd pain like this with her prior two pregnancies.  She denies pain elsewhere, vaginal bleeding, leakage of fluid. Endorses good fetal movement, denies contractions loss of fluid and vaginal bleeding. Pt denies headaches, visual disturbances, RUQ pain, epigastric pain and new-onset edema. She denies fevers, chills, nausea, vomiting, shortness of breath, chest pain, and palpitations.    Pregnancy course is significant for: N/A     POB: 2 prior uncomplicated pregnancies in  and , both born    PGYN: -fibroids/-cysts, denied STD hx  PMH: calculus in GB and bile duct w/ acute and chronic cholecyst w/o obstruction  PSH: cholecystectomy in   SH: Denies tobacco use, EtOH use and illicit drug use during the pregnancy; Feels safe at home  Meds: prenatal vitamins, including iron  All: none     T(C): 37.3 (07-15-24 @ 08:37), Max: 37.3 (07-15-24 @ 08:33)  HR: 96 (07-15-24 @ 08:37) (96 - 96)  BP: 113/74 (07-15-24 @ 08:37) (113/74 - 113/74)  RR: 18 (07-15-24 @ 08:37) (18 - 18)  SpO2: --  Gen: NAD, well-appearing  Abd: soft, gravid  Ext: non-edematous, non-tender   SVE: 1.0, 50, -3  SSE: cervix visualized, closed and without any signs of bleeding or drainage, no pooling   FHT:   [place]:     A/P: SVE at 11:00 (7/15)   Maternal VSS  FHT ***reassuring  [place]:   Dispo: Continue to observe.     Discussed with   KT CROWE is a 28y  at 38 weeks, 5 days GA, LMP 23, who presents to L&D for acute intermittent episodes of lower abdominal pain. Patient says the abd pain began last night () at 10pm with a 5/10 intensity, aggravated by bending. She began having contractions at 3am this morning, occurring every 15 minutes, with associated 2/10 back pain. The abdominal pain occurs concurrently during some, but not all, of her contractions. She mentions that she has never had abd pain like this with her prior two pregnancies. She denies pain elsewhere, no vaginal bleeding, no leakage of fluid. Endorses good fetal movement, denies contractions loss of fluid and vaginal bleeding. Pt denies headaches, visual disturbances, RUQ pain, epigastric pain and new-onset edema. She denies fevers, chills, nausea, vomiting, shortness of breath, chest pain, and palpitations.    Pregnancy course is significant for: N/A     POB: 2 prior uncomplicated pregnancies in  and , both born    PGYN: -fibroids/-cysts, denied STD hx  PMH: calculus in GB and bile duct w/ acute and chronic cholecyst w/o obstruction  PSH: cholecystectomy in   SH: Denies tobacco use, EtOH use and illicit drug use during the pregnancy; Feels safe at home  Meds: prenatal vitamins, iron  All: none

## 2024-07-15 NOTE — OB PROVIDER TRIAGE NOTE - NSOBPROVIDERNOTE_OBGYN_ALL_OB_FT
A/P            7/15 @ 1040 - Patient's pain has decreased. She is requesting to leave and lives 5 minutes away from the hospital. She has been given OB labor precautions and told to immediately come back if there is a change in status A/P: 28y  at 38/5 weeks, who presents to L&D for acute intermittent episodes of lower abdominal pain, r/o labor   - SVE at 11:00 (7/15)   - Maternal VSS  - FHT: reassuring   - [place]: abimael q5 min abimael   - Dispo: Recheck SVE in 2 hours for progression of labor/dilation       7/15 @ 1040 - Repeat SVE with dilation noted to 3cm, patient in latent labor and admission was recommended. Patient declined and requested discharge. Return precautions discussed, patient expressed understanding.

## 2024-07-15 NOTE — OB RN TRIAGE NOTE - NS_TRIAGEADDITIONAL COMMENTS_OBGYN_ALL_OB_FT
Discharge instructions with labor precautions given to patient. Pt and spouse verbalize understanding of labor precautions and deny any further questions at this time.

## 2024-07-15 NOTE — OB RN TRIAGE NOTE - CHIEF COMPLAINT QUOTE
"I have contractions since 3 in the morning but I also feel a lot of pain in my lower belly that is different from contractions I felt in my past pregnancies"

## 2024-07-17 DIAGNOSIS — O47.1 FALSE LABOR AT OR AFTER 37 COMPLETED WEEKS OF GESTATION: ICD-10-CM

## 2024-07-17 DIAGNOSIS — O26.899 OTHER SPECIFIED PREGNANCY RELATED CONDITIONS, UNSPECIFIED TRIMESTER: ICD-10-CM

## 2024-07-17 DIAGNOSIS — Z3A.38 38 WEEKS GESTATION OF PREGNANCY: ICD-10-CM

## 2024-07-18 ENCOUNTER — INPATIENT (INPATIENT)
Facility: HOSPITAL | Age: 29
LOS: 2 days | Discharge: HOME | DRG: 833 | End: 2024-07-21
Attending: SPECIALIST | Admitting: SPECIALIST
Payer: COMMERCIAL

## 2024-07-18 VITALS — DIASTOLIC BLOOD PRESSURE: 69 MMHG | HEART RATE: 97 BPM | SYSTOLIC BLOOD PRESSURE: 113 MMHG

## 2024-07-18 DIAGNOSIS — O26.893 OTHER SPECIFIED PREGNANCY RELATED CONDITIONS, THIRD TRIMESTER: ICD-10-CM

## 2024-07-18 DIAGNOSIS — O26.899 OTHER SPECIFIED PREGNANCY RELATED CONDITIONS, UNSPECIFIED TRIMESTER: ICD-10-CM

## 2024-07-18 DIAGNOSIS — Z90.49 ACQUIRED ABSENCE OF OTHER SPECIFIED PARTS OF DIGESTIVE TRACT: Chronic | ICD-10-CM

## 2024-07-18 LAB
BASOPHILS # BLD AUTO: 0.05 K/UL — SIGNIFICANT CHANGE UP (ref 0–0.2)
BASOPHILS NFR BLD AUTO: 0.6 % — SIGNIFICANT CHANGE UP (ref 0–2)
BLD GP AB SCN SERPL QL: SIGNIFICANT CHANGE UP
EOSINOPHIL # BLD AUTO: 0.09 K/UL — SIGNIFICANT CHANGE UP (ref 0–0.5)
EOSINOPHIL NFR BLD AUTO: 1.1 % — SIGNIFICANT CHANGE UP (ref 0–6)
HCT VFR BLD CALC: 35.6 % — SIGNIFICANT CHANGE UP (ref 34.5–45)
HGB BLD-MCNC: 11.4 G/DL — LOW (ref 11.5–15.5)
IMM GRANULOCYTES NFR BLD AUTO: 0.7 % — SIGNIFICANT CHANGE UP (ref 0–0.9)
LYMPHOCYTES # BLD AUTO: 1.63 K/UL — SIGNIFICANT CHANGE UP (ref 1–3.3)
LYMPHOCYTES # BLD AUTO: 20 % — SIGNIFICANT CHANGE UP (ref 13–44)
MCHC RBC-ENTMCNC: 24.7 PG — LOW (ref 27–34)
MCHC RBC-ENTMCNC: 32 GM/DL — SIGNIFICANT CHANGE UP (ref 32–36)
MCV RBC AUTO: 77.1 FL — LOW (ref 80–100)
MONOCYTES # BLD AUTO: 0.74 K/UL — SIGNIFICANT CHANGE UP (ref 0–0.9)
MONOCYTES NFR BLD AUTO: 9.1 % — SIGNIFICANT CHANGE UP (ref 2–14)
NEUTROPHILS # BLD AUTO: 5.6 K/UL — SIGNIFICANT CHANGE UP (ref 1.8–7.4)
NEUTROPHILS NFR BLD AUTO: 68.5 % — SIGNIFICANT CHANGE UP (ref 43–77)
PLATELET # BLD AUTO: 254 K/UL — SIGNIFICANT CHANGE UP (ref 150–400)
RBC # BLD: 4.62 M/UL — SIGNIFICANT CHANGE UP (ref 3.8–5.2)
RBC # FLD: 18 % — HIGH (ref 10.3–14.5)
WBC # BLD: 8.17 K/UL — SIGNIFICANT CHANGE UP (ref 3.8–10.5)
WBC # FLD AUTO: 8.17 K/UL — SIGNIFICANT CHANGE UP (ref 3.8–10.5)

## 2024-07-18 RX ORDER — AMPICILLIN SODIUM 1 G/1
1 INJECTION, POWDER, FOR SOLUTION INTRAMUSCULAR; INTRAVENOUS EVERY 4 HOURS
Refills: 0 | Status: DISCONTINUED | OUTPATIENT
Start: 2024-07-18 | End: 2024-07-19

## 2024-07-18 RX ORDER — OXYTOCIN-SODIUM CHLORIDE 0.9% IV SOLN 30 UNIT/500ML 30-0.9/5 UT/ML-%
333.33 SOLUTION INTRAVENOUS
Qty: 20 | Refills: 0 | Status: DISCONTINUED | OUTPATIENT
Start: 2024-07-18 | End: 2024-07-19

## 2024-07-18 RX ORDER — AMPICILLIN SODIUM 1 G/1
2 INJECTION, POWDER, FOR SOLUTION INTRAMUSCULAR; INTRAVENOUS ONCE
Refills: 0 | Status: COMPLETED | OUTPATIENT
Start: 2024-07-18 | End: 2024-07-18

## 2024-07-18 RX ORDER — SALINE 7; 19 G/118ML; G/118ML
1 ENEMA RECTAL ONCE
Refills: 0 | Status: COMPLETED | OUTPATIENT
Start: 2024-07-18 | End: 2024-07-18

## 2024-07-18 RX ORDER — CITRIC ACID/SODIUM CITRATE 300-500 MG
15 SOLUTION, ORAL ORAL EVERY 6 HOURS
Refills: 0 | Status: DISCONTINUED | OUTPATIENT
Start: 2024-07-18 | End: 2024-07-18

## 2024-07-18 RX ORDER — SODIUM CHLORIDE 9 G/1000ML
1000 INJECTION, SOLUTION INTRAVENOUS
Refills: 0 | Status: DISCONTINUED | OUTPATIENT
Start: 2024-07-18 | End: 2024-07-18

## 2024-07-18 RX ORDER — SODIUM CHLORIDE 9 G/1000ML
1000 INJECTION, SOLUTION INTRAVENOUS
Refills: 0 | Status: DISCONTINUED | OUTPATIENT
Start: 2024-07-18 | End: 2024-07-19

## 2024-07-18 RX ORDER — OXYTOCIN-SODIUM CHLORIDE 0.9% IV SOLN 30 UNIT/500ML 30-0.9/5 UT/ML-%
SOLUTION INTRAVENOUS
Qty: 30 | Refills: 0 | Status: DISCONTINUED | OUTPATIENT
Start: 2024-07-18 | End: 2024-07-19

## 2024-07-18 RX ORDER — OXYTOCIN-SODIUM CHLORIDE 0.9% IV SOLN 30 UNIT/500ML 30-0.9/5 UT/ML-%
333.33 SOLUTION INTRAVENOUS
Qty: 20 | Refills: 0 | Status: DISCONTINUED | OUTPATIENT
Start: 2024-07-18 | End: 2024-07-18

## 2024-07-18 RX ORDER — CITRIC ACID/SODIUM CITRATE 300-500 MG
15 SOLUTION, ORAL ORAL EVERY 6 HOURS
Refills: 0 | Status: DISCONTINUED | OUTPATIENT
Start: 2024-07-18 | End: 2024-07-19

## 2024-07-18 RX ADMIN — OXYTOCIN-SODIUM CHLORIDE 0.9% IV SOLN 30 UNIT/500ML 2 MILLIUNIT(S)/MIN: 30-0.9/5 SOLUTION at 22:05

## 2024-07-18 RX ADMIN — SALINE 1 ENEMA: 7; 19 ENEMA RECTAL at 20:25

## 2024-07-18 RX ADMIN — SODIUM CHLORIDE 125 MILLILITER(S): 9 INJECTION, SOLUTION INTRAVENOUS at 22:05

## 2024-07-18 RX ADMIN — AMPICILLIN SODIUM 200 GRAM(S): 1 INJECTION, POWDER, FOR SOLUTION INTRAMUSCULAR; INTRAVENOUS at 20:47

## 2024-07-18 RX ADMIN — SODIUM CHLORIDE 125 MILLILITER(S): 9 INJECTION, SOLUTION INTRAVENOUS at 18:53

## 2024-07-19 ENCOUNTER — TRANSCRIPTION ENCOUNTER (OUTPATIENT)
Age: 29
End: 2024-07-19

## 2024-07-19 LAB
HCV AB S/CO SERPL IA: 0.09 S/CO — SIGNIFICANT CHANGE UP (ref 0–0.99)
HCV AB SERPL-IMP: SIGNIFICANT CHANGE UP
T PALLIDUM AB TITR SER: NEGATIVE — SIGNIFICANT CHANGE UP

## 2024-07-19 RX ORDER — IBUPROFEN 200 MG
600 TABLET ORAL EVERY 6 HOURS
Refills: 0 | Status: COMPLETED | OUTPATIENT
Start: 2024-07-19 | End: 2025-06-17

## 2024-07-19 RX ORDER — OXYTOCIN-SODIUM CHLORIDE 0.9% IV SOLN 30 UNIT/500ML 30-0.9/5 UT/ML-%
41.67 SOLUTION INTRAVENOUS
Qty: 20 | Refills: 0 | Status: DISCONTINUED | OUTPATIENT
Start: 2024-07-19 | End: 2024-07-21

## 2024-07-19 RX ORDER — BENZOCAINE 220 MG/G
1 SPRAY, METERED PERIODONTAL EVERY 6 HOURS
Refills: 0 | Status: DISCONTINUED | OUTPATIENT
Start: 2024-07-19 | End: 2024-07-21

## 2024-07-19 RX ORDER — PRENATAL 136/IRON/FOLIC ACID 27 MG-1 MG
1 TABLET ORAL DAILY
Refills: 0 | Status: DISCONTINUED | OUTPATIENT
Start: 2024-07-19 | End: 2024-07-21

## 2024-07-19 RX ORDER — MODIFIED LANOLIN 100 %
1 CREAM (GRAM) TOPICAL EVERY 6 HOURS
Refills: 0 | Status: DISCONTINUED | OUTPATIENT
Start: 2024-07-19 | End: 2024-07-21

## 2024-07-19 RX ORDER — DIPHENHYDRAMINE HCL 12.5MG/5ML
25 ELIXIR ORAL EVERY 6 HOURS
Refills: 0 | Status: DISCONTINUED | OUTPATIENT
Start: 2024-07-19 | End: 2024-07-21

## 2024-07-19 RX ORDER — PRAMOXINE HCL 1 %
1 GEL (GRAM) TOPICAL EVERY 4 HOURS
Refills: 0 | Status: DISCONTINUED | OUTPATIENT
Start: 2024-07-19 | End: 2024-07-21

## 2024-07-19 RX ORDER — OXYCODONE HYDROCHLORIDE 30 MG/1
5 TABLET ORAL ONCE
Refills: 0 | Status: DISCONTINUED | OUTPATIENT
Start: 2024-07-19 | End: 2024-07-21

## 2024-07-19 RX ORDER — OXYTOCIN-SODIUM CHLORIDE 0.9% IV SOLN 30 UNIT/500ML 30-0.9/5 UT/ML-%
333.33 SOLUTION INTRAVENOUS
Qty: 20 | Refills: 0 | Status: DISCONTINUED | OUTPATIENT
Start: 2024-07-19 | End: 2024-07-21

## 2024-07-19 RX ORDER — DIBUCAINE 10 MG/G
1 OINTMENT TOPICAL EVERY 6 HOURS
Refills: 0 | Status: DISCONTINUED | OUTPATIENT
Start: 2024-07-19 | End: 2024-07-21

## 2024-07-19 RX ORDER — HYDROCORTISONE 10 MG/G
1 CREAM TOPICAL EVERY 6 HOURS
Refills: 0 | Status: DISCONTINUED | OUTPATIENT
Start: 2024-07-19 | End: 2024-07-21

## 2024-07-19 RX ORDER — OXYCODONE HYDROCHLORIDE 30 MG/1
5 TABLET ORAL
Refills: 0 | Status: DISCONTINUED | OUTPATIENT
Start: 2024-07-19 | End: 2024-07-21

## 2024-07-19 RX ORDER — SIMETHICONE 80 MG
80 TABLET,CHEWABLE ORAL EVERY 4 HOURS
Refills: 0 | Status: DISCONTINUED | OUTPATIENT
Start: 2024-07-19 | End: 2024-07-21

## 2024-07-19 RX ORDER — IBUPROFEN 200 MG
600 TABLET ORAL EVERY 6 HOURS
Refills: 0 | Status: DISCONTINUED | OUTPATIENT
Start: 2024-07-19 | End: 2024-07-21

## 2024-07-19 RX ORDER — ACETAMINOPHEN 500 MG/5ML
975 LIQUID (ML) ORAL
Refills: 0 | Status: DISCONTINUED | OUTPATIENT
Start: 2024-07-19 | End: 2024-07-21

## 2024-07-19 RX ORDER — MAGNESIUM HYDROXIDE 400 MG/5ML
30 SUSPENSION ORAL
Refills: 0 | Status: DISCONTINUED | OUTPATIENT
Start: 2024-07-19 | End: 2024-07-21

## 2024-07-19 RX ORDER — KETOROLAC TROMETHAMINE 30 MG/ML
30 INJECTION, SOLUTION INTRAMUSCULAR; INTRAVENOUS ONCE
Refills: 0 | Status: DISCONTINUED | OUTPATIENT
Start: 2024-07-19 | End: 2024-07-19

## 2024-07-19 RX ORDER — WITCH HAZEL LEAF
1 FLUID EXTRACT MISCELLANEOUS EVERY 4 HOURS
Refills: 0 | Status: DISCONTINUED | OUTPATIENT
Start: 2024-07-19 | End: 2024-07-21

## 2024-07-19 RX ADMIN — AMPICILLIN SODIUM 108 GRAM(S): 1 INJECTION, POWDER, FOR SOLUTION INTRAMUSCULAR; INTRAVENOUS at 04:50

## 2024-07-19 RX ADMIN — Medication 975 MILLIGRAM(S): at 15:39

## 2024-07-19 RX ADMIN — Medication 600 MILLIGRAM(S): at 19:38

## 2024-07-19 RX ADMIN — Medication 3 MILLILITER(S): at 22:39

## 2024-07-19 RX ADMIN — Medication 600 MILLIGRAM(S): at 23:22

## 2024-07-19 RX ADMIN — Medication 0.2 MILLIGRAM(S): at 08:08

## 2024-07-19 RX ADMIN — Medication 975 MILLIGRAM(S): at 21:16

## 2024-07-19 RX ADMIN — SODIUM CHLORIDE 125 MILLILITER(S): 9 INJECTION, SOLUTION INTRAVENOUS at 00:54

## 2024-07-19 RX ADMIN — AMPICILLIN SODIUM 108 GRAM(S): 1 INJECTION, POWDER, FOR SOLUTION INTRAMUSCULAR; INTRAVENOUS at 00:50

## 2024-07-19 RX ADMIN — OXYTOCIN-SODIUM CHLORIDE 0.9% IV SOLN 30 UNIT/500ML 1000 MILLIUNIT(S)/MIN: 30-0.9/5 SOLUTION at 09:23

## 2024-07-19 RX ADMIN — KETOROLAC TROMETHAMINE 30 MILLIGRAM(S): 30 INJECTION, SOLUTION INTRAMUSCULAR; INTRAVENOUS at 11:00

## 2024-07-19 RX ADMIN — OXYTOCIN-SODIUM CHLORIDE 0.9% IV SOLN 30 UNIT/500ML 125 MILLIUNIT(S)/MIN: 30-0.9/5 SOLUTION at 09:24

## 2024-07-19 RX ADMIN — KETOROLAC TROMETHAMINE 30 MILLIGRAM(S): 30 INJECTION, SOLUTION INTRAMUSCULAR; INTRAVENOUS at 11:38

## 2024-07-20 LAB
HCT VFR BLD CALC: 30.8 % — LOW (ref 34.5–45)
HGB BLD-MCNC: 9.6 G/DL — LOW (ref 11.5–15.5)
MCHC RBC-ENTMCNC: 24.7 PG — LOW (ref 27–34)
MCHC RBC-ENTMCNC: 31.2 GM/DL — LOW (ref 32–36)
MCV RBC AUTO: 79.4 FL — LOW (ref 80–100)
PLATELET # BLD AUTO: 214 K/UL — SIGNIFICANT CHANGE UP (ref 150–400)
RBC # BLD: 3.88 M/UL — SIGNIFICANT CHANGE UP (ref 3.8–5.2)
RBC # FLD: 18.4 % — HIGH (ref 10.3–14.5)
WBC # BLD: 10.48 K/UL — SIGNIFICANT CHANGE UP (ref 3.8–10.5)
WBC # FLD AUTO: 10.48 K/UL — SIGNIFICANT CHANGE UP (ref 3.8–10.5)

## 2024-07-20 PROCEDURE — 88302 TISSUE EXAM BY PATHOLOGIST: CPT | Mod: 26

## 2024-07-20 RX ORDER — SODIUM CHLORIDE 9 G/1000ML
1000 INJECTION, SOLUTION INTRAVENOUS ONCE
Refills: 0 | Status: COMPLETED | OUTPATIENT
Start: 2024-07-20 | End: 2024-07-20

## 2024-07-20 RX ORDER — IBUPROFEN 200 MG
1 TABLET ORAL
Qty: 20 | Refills: 0
Start: 2024-07-20 | End: 2024-07-24

## 2024-07-20 RX ORDER — PRENATAL 136/IRON/FOLIC ACID 27 MG-1 MG
1 TABLET ORAL
Qty: 0 | Refills: 0 | DISCHARGE
Start: 2024-07-20

## 2024-07-20 RX ORDER — CITRIC ACID/SODIUM CITRATE 300-500 MG
30 SOLUTION, ORAL ORAL ONCE
Refills: 0 | Status: COMPLETED | OUTPATIENT
Start: 2024-07-20 | End: 2024-07-20

## 2024-07-20 RX ORDER — ACETAMINOPHEN 500 MG/5ML
2 LIQUID (ML) ORAL
Qty: 40 | Refills: 0
Start: 2024-07-20 | End: 2024-07-24

## 2024-07-20 RX ADMIN — Medication 600 MILLIGRAM(S): at 23:40

## 2024-07-20 RX ADMIN — Medication 975 MILLIGRAM(S): at 08:57

## 2024-07-20 RX ADMIN — Medication 975 MILLIGRAM(S): at 02:14

## 2024-07-20 RX ADMIN — Medication 600 MILLIGRAM(S): at 17:16

## 2024-07-20 RX ADMIN — Medication 975 MILLIGRAM(S): at 15:38

## 2024-07-20 RX ADMIN — Medication 600 MILLIGRAM(S): at 06:26

## 2024-07-20 RX ADMIN — SODIUM CHLORIDE 1000 MILLILITER(S): 9 INJECTION, SOLUTION INTRAVENOUS at 07:39

## 2024-07-20 RX ADMIN — Medication 30 MILLILITER(S): at 07:39

## 2024-07-20 RX ADMIN — Medication 3 MILLILITER(S): at 06:32

## 2024-07-20 RX ADMIN — Medication 975 MILLIGRAM(S): at 21:54

## 2024-07-20 RX ADMIN — Medication 3 MILLILITER(S): at 22:57

## 2024-07-21 VITALS
TEMPERATURE: 99 F | SYSTOLIC BLOOD PRESSURE: 93 MMHG | HEART RATE: 67 BPM | OXYGEN SATURATION: 99 % | DIASTOLIC BLOOD PRESSURE: 54 MMHG | RESPIRATION RATE: 18 BRPM

## 2024-07-21 PROCEDURE — 86803 HEPATITIS C AB TEST: CPT

## 2024-07-21 PROCEDURE — 86901 BLOOD TYPING SEROLOGIC RH(D): CPT

## 2024-07-21 PROCEDURE — 59050 FETAL MONITOR W/REPORT: CPT

## 2024-07-21 PROCEDURE — 86850 RBC ANTIBODY SCREEN: CPT

## 2024-07-21 PROCEDURE — 85027 COMPLETE CBC AUTOMATED: CPT

## 2024-07-21 PROCEDURE — 88302 TISSUE EXAM BY PATHOLOGIST: CPT

## 2024-07-21 PROCEDURE — 86900 BLOOD TYPING SEROLOGIC ABO: CPT

## 2024-07-21 PROCEDURE — T1013: CPT

## 2024-07-21 PROCEDURE — 36415 COLL VENOUS BLD VENIPUNCTURE: CPT

## 2024-07-21 PROCEDURE — 86780 TREPONEMA PALLIDUM: CPT

## 2024-07-21 PROCEDURE — 85025 COMPLETE CBC W/AUTO DIFF WBC: CPT

## 2024-07-21 RX ADMIN — Medication 975 MILLIGRAM(S): at 09:07

## 2024-07-21 RX ADMIN — Medication 600 MILLIGRAM(S): at 06:23

## 2024-07-21 RX ADMIN — Medication 975 MILLIGRAM(S): at 02:53

## 2024-07-24 LAB — SURGICAL PATHOLOGY STUDY: SIGNIFICANT CHANGE UP

## (undated) DEVICE — ELCTR BOVIE PENCIL SMOKE EVACUATION

## (undated) DEVICE — SUT MONOCRYL 4-0 27" PS-2 UNDYED

## (undated) DEVICE — TUBING INSUFFLATION LAP FILTER 10FT

## (undated) DEVICE — WARMING BLANKET FULL UNDERBODY

## (undated) DEVICE — CANISTER DISPOSABLE THIN WALL 3000CC

## (undated) DEVICE — SOL IRR POUR H2O 500ML

## (undated) DEVICE — DISSECTOR ENDOSCOPIC KITTNER SINGLE TIP

## (undated) DEVICE — ELCTR BOVIE TIP BLADE INSULATED 2.75" EDGE

## (undated) DEVICE — SOL IRR POUR NS 0.9% 1000ML

## (undated) DEVICE — PACK GENERAL LAPAROSCOPY

## (undated) DEVICE — TUBING STRYKEFLOW II SUCTION / IRRIGATOR

## (undated) DEVICE — WARMING BLANKET UPPER ADULT

## (undated) DEVICE — ENDOCATCH 10MM SPECIMEN POUCH

## (undated) DEVICE — ELCTR GROUNDING PAD ADULT COVIDIEN

## (undated) DEVICE — TUBING OLYMPUS INSUFFLATION

## (undated) DEVICE — POSITIONER STRAP ARMBOARD VELCRO TS-30

## (undated) DEVICE — TIP METZENBAUM SCISSOR MONOPOLAR ENDOCUT (ORANGE)

## (undated) DEVICE — DRAPE 3/4 SHEET 52X76"

## (undated) DEVICE — VENODYNE/SCD SLEEVE CALF MEDIUM

## (undated) DEVICE — Device

## (undated) DEVICE — BLADE SURGICAL #15 CARBON

## (undated) DEVICE — BASIN SET SINGLE

## (undated) DEVICE — D HELP - CLEARVIEW CLEARIFY SYSTEM

## (undated) DEVICE — TROCAR COVIDIEN BLUNT TIP HASSAN 10MM STANDARD

## (undated) DEVICE — PROTECTOR HEEL / ELBOW

## (undated) DEVICE — SYR LUER LOK 20CC

## (undated) DEVICE — ELCTR REM POLYHESIVE PATIENT RETURN ELECTRODE ADULT

## (undated) DEVICE — TROCAR COVIDIEN VERSAPORT BLADELESS OPTICAL 5MM STANDARD

## (undated) DEVICE — SUT VICRYL 0 27" UR-6

## (undated) DEVICE — DRSG STERISTRIPS 0.5 X 4"

## (undated) DEVICE — DRAPE TOWEL BLUE 17" X 24"